# Patient Record
Sex: FEMALE | Race: WHITE | NOT HISPANIC OR LATINO | Employment: FULL TIME | URBAN - METROPOLITAN AREA
[De-identification: names, ages, dates, MRNs, and addresses within clinical notes are randomized per-mention and may not be internally consistent; named-entity substitution may affect disease eponyms.]

---

## 2018-01-15 NOTE — PROGRESS NOTES
Assessment    1  Bacterial vaginosis (616 10,041 9) (N76 0,A49 9)   2  Vaginal yeast infection (112 1) (B37 3)   3  Bartholin cyst (616 2) (N75 0)    Plan  Bacterial vaginosis    · MetroNIDAZOLE 500 MG Oral Tablet; TAKE 1 TABLET TWICE DAILY UNTIL  FINISHED   Rx By: Enedina Santoyo; Dispense: 7 Days ; #:14 Tablet; Refill: 0; For: Bacterial vaginosis; ED = N; Verified Transmission to [facility] Last Updated By: System, [employer]; 1/18/2016 10:41:54 AM  Bacterial vaginosis, Vaginal yeast infection    · Wet [place]- POC; Status:Complete;   Done: [id number] 12:00AM   Performed: In Office; VEX:[id number]; Today; For:Bacterial vaginosis, Vaginal yeast infection; Ordered By:Mala Boss;  Vaginal yeast infection    · Monistat 3 Combination Pack Vaginal Kit; SUPP: 1 VAGINALLY DAILY AT BEDTIME  FOR 3 NIGHTS  CRM: APPLY TWICE DAILY AS NEEDED   Rx By: Enedina Santoyo; Dispense: 3 Days ; #:1 GM; Refill: 0; For: Vaginal yeast infection; ED = N; Verified Transmission to [facility] Last Updated By: System, [employer]; 1/18/2016 10:41:54 AM    Discussion/Summary  Discussion Summary:     16yo female with word catheter in place for Bartholin cyst   1) Bartholin cyst treated  -- no pain, no erythema, epithelialization visualized around word catheter entrance site  -- word catheter removed without difficulty  2) foul discharge -- bacterial vaginosis and yeast infection  -- abundant clue cells, +whiff, and moderate yeast seen on wet mount and KOH  -- rx sent for metronidazole 500mg bid for 7days and monistat 3 for yeast infection  Pt to RTC as needed for annual exams  Patient and plan d/w Dr Yenny Garcia  Chief Complaint  Chief Complaint Free Text Note Form: Patient is here for follow up for Bartholin cyst       History of Present Illness  HPI:   16yo female with word catheter in place from Bartholin's cyst  Pt is here for follow up and hopefully removal of catheter   Pt reports no issues or pain with catheter, denies fever/chills/sweats  Denies urinary issues  Reports some discharge, fishy in odor that has increased since taking antibiotics when word catheter placed  Pt has not been sexually active since catheter placement  Hospital Based Practices Required Assessment:   Pain Assessment   the patient states they do not have pain  Abuse And Domestic Violence Screen    Yes, the patient is safe at home  The patient states no one is hurting them  Depression And Suicide Screen  No, the patient has not had thoughts of hurting themself  No, the patient has not felt depressed in the past 7 days  Prefered Language is  [place]  Primary Language is  English  Active Problems    1  Anxiety (300 00) (F41 9)   2  Bartholin cyst (616 2) (N75 0)    Family History    1  Family history of Hypothyroidism    Social History    · Caffeine Use   · Current Every Day Smoker (305 1)   · Marital History - Single   · Occupation:   · Occupation:    Allergies    1  Clindamycin    Vitals  Vital Signs [Data Includes: Current Encounter]    Recorded: 30WAG4848 15:34KG   Systolic 365   Diastolic 58   Height 5 ft 1 in   Weight 157 lb    BMI Calculated 29 67   BSA Calculated 1 7   LMP [id number]     Physical Exam    Genitourinary   External genitalia: Normal and no lesions appreciated  word catheter in place, no erythema, no warmth, no drainage  Vagina: Normal, no lesions or dryness appreciated  no lacerations or lesions  Foul fish like odor with discharge  Urethra: Normal     Cervix: Normal, no palpable masses  thin discharge from os  Uterus: Normal, non-tender, not enlarged, and no palpable masses  Adnexa/parametria: Normal, non-tender and no fullness or masses appreciated      Psychiatric   Orientation to person, place, and time: Normal     Mood and affect: Normal        Results/Data  Encounter Results   Screen Ebola Flowsheet [id number] 09:49AM      Test Name Result Flag Reference   Exposure to Ebola Virus - Within 21 Days No       Wet [facility] [id number] 12:00AM Florin Marquez     Test Name Result Flag Reference   BACTERIA none     CLUE CELL abundant     TRICHOMONAS none     YEAST WITH HYPHAE moderate     YEAST moderate     KOH PREP yeast visualized         Signatures   Electronically signed by : SUN Dunbar ; Jan 18 2016  2:49PM EST                       (Author)    Electronically signed by : Donald Almazan MD; Jan 25 2016  1:55PM EST

## 2018-09-15 ENCOUNTER — HOSPITAL ENCOUNTER (EMERGENCY)
Facility: HOSPITAL | Age: 23
Discharge: HOME | End: 2018-09-15
Attending: EMERGENCY MEDICINE | Admitting: EMERGENCY MEDICINE
Payer: SUBSIDIZED

## 2018-09-15 ENCOUNTER — APPOINTMENT (EMERGENCY)
Dept: RADIOLOGY | Facility: HOSPITAL | Age: 23
End: 2018-09-15
Payer: SUBSIDIZED

## 2018-09-15 VITALS
RESPIRATION RATE: 18 BRPM | OXYGEN SATURATION: 100 % | TEMPERATURE: 98.5 F | BODY MASS INDEX: 28.34 KG/M2 | DIASTOLIC BLOOD PRESSURE: 50 MMHG | WEIGHT: 150 LBS | HEART RATE: 76 BPM | SYSTOLIC BLOOD PRESSURE: 95 MMHG

## 2018-09-15 DIAGNOSIS — N39.0 UTI (URINARY TRACT INFECTION): Primary | ICD-10-CM

## 2018-09-15 LAB
BACTERIA UR QL AUTO: ABNORMAL /HPF
BILIRUB UR QL STRIP: ABNORMAL
CLARITY UR: ABNORMAL
COLOR UR: YELLOW
EXT PREG TEST URINE: NEGATIVE
GLUCOSE UR STRIP-MCNC: NEGATIVE MG/DL
HGB UR QL STRIP.AUTO: ABNORMAL
KETONES UR STRIP-MCNC: NEGATIVE MG/DL
LEUKOCYTE ESTERASE UR QL STRIP: ABNORMAL
NITRITE UR QL STRIP: NEGATIVE
NON-SQ EPI CELLS URNS QL MICRO: ABNORMAL /HPF
PH UR STRIP.AUTO: 5.5 [PH] (ref 5–9)
PROT UR STRIP-MCNC: NEGATIVE MG/DL
RBC #/AREA URNS AUTO: ABNORMAL /HPF
SP GR UR STRIP.AUTO: >=1.03 (ref 1–1.03)
UROBILINOGEN UR QL STRIP.AUTO: 1 E.U./DL
WBC #/AREA URNS AUTO: ABNORMAL /HPF

## 2018-09-15 PROCEDURE — 81025 URINE PREGNANCY TEST: CPT | Performed by: EMERGENCY MEDICINE

## 2018-09-15 PROCEDURE — 99284 EMERGENCY DEPT VISIT MOD MDM: CPT

## 2018-09-15 PROCEDURE — 74176 CT ABD & PELVIS W/O CONTRAST: CPT

## 2018-09-15 PROCEDURE — 81001 URINALYSIS AUTO W/SCOPE: CPT | Performed by: EMERGENCY MEDICINE

## 2018-09-15 RX ORDER — PHENAZOPYRIDINE HYDROCHLORIDE 200 MG/1
200 TABLET, FILM COATED ORAL 3 TIMES DAILY
Qty: 6 TABLET | Refills: 0 | Status: SHIPPED | OUTPATIENT
Start: 2018-09-15 | End: 2018-09-15

## 2018-09-15 RX ORDER — SULFAMETHOXAZOLE AND TRIMETHOPRIM 800; 160 MG/1; MG/1
1 TABLET ORAL 2 TIMES DAILY
Qty: 14 TABLET | Refills: 0 | Status: SHIPPED | OUTPATIENT
Start: 2018-09-15 | End: 2018-09-22

## 2018-09-15 RX ORDER — SULFAMETHOXAZOLE AND TRIMETHOPRIM 800; 160 MG/1; MG/1
1 TABLET ORAL ONCE
Status: COMPLETED | OUTPATIENT
Start: 2018-09-15 | End: 2018-09-15

## 2018-09-15 RX ORDER — PHENAZOPYRIDINE HYDROCHLORIDE 100 MG/1
100 TABLET, FILM COATED ORAL ONCE
Status: COMPLETED | OUTPATIENT
Start: 2018-09-15 | End: 2018-09-15

## 2018-09-15 RX ORDER — IBUPROFEN 600 MG/1
600 TABLET ORAL ONCE
Status: COMPLETED | OUTPATIENT
Start: 2018-09-15 | End: 2018-09-15

## 2018-09-15 RX ORDER — PHENAZOPYRIDINE HYDROCHLORIDE 200 MG/1
200 TABLET, FILM COATED ORAL 3 TIMES DAILY
Qty: 6 TABLET | Refills: 0 | Status: SHIPPED | OUTPATIENT
Start: 2018-09-15

## 2018-09-15 RX ORDER — SULFAMETHOXAZOLE AND TRIMETHOPRIM 800; 160 MG/1; MG/1
1 TABLET ORAL 2 TIMES DAILY
Qty: 14 TABLET | Refills: 0 | Status: SHIPPED | OUTPATIENT
Start: 2018-09-15 | End: 2018-09-15

## 2018-09-15 RX ADMIN — PHENAZOPYRIDINE HYDROCHLORIDE 100 MG: 100 TABLET ORAL at 23:08

## 2018-09-15 RX ADMIN — SULFAMETHOXAZOLE AND TRIMETHOPRIM 1 TABLET: 800; 160 TABLET ORAL at 23:08

## 2018-09-15 RX ADMIN — IBUPROFEN 600 MG: 600 TABLET, FILM COATED ORAL at 23:08

## 2018-09-16 NOTE — DISCHARGE INSTRUCTIONS

## 2018-09-16 NOTE — ED PROVIDER NOTES
History  Chief Complaint   Patient presents with    Possible UTI     States she had " tingling " when voided this morning  States pain left flank and left lower abdomen and states when she was pregnant she was told she has stones in her kidney, flank pain started at 2 pm    Flank Pain     23 yowf c/o burning sensation with urination since this morning and then about 2 pm today started with severe left flank pain  + associated nausea, no vomiting or diarrhea  No fever  Is on 5 year IUD, denies pregnancy, vaginal bleeding or discharge  History provided by:  Patient   used: No    Flank Pain   Associated symptoms: dysuria    Associated symptoms: no chest pain, no cough, no diarrhea, no fever, no nausea, no shortness of breath and no vomiting        None       Past Medical History:   Diagnosis Date    Kidney stones        Past Surgical History:   Procedure Laterality Date     SECTION      x 1    TONSILLECTOMY         History reviewed  No pertinent family history  I have reviewed and agree with the history as documented  Social History   Substance Use Topics    Smoking status: Current Some Day Smoker     Packs/day: 0 20     Types: Cigarettes    Smokeless tobacco: Never Used    Alcohol use No        Review of Systems   Constitutional: Negative  Negative for fever  HENT: Negative  Eyes: Negative  Respiratory: Negative  Negative for cough and shortness of breath  Cardiovascular: Negative  Negative for chest pain  Gastrointestinal: Negative  Negative for abdominal pain, diarrhea, nausea and vomiting  Genitourinary: Positive for dysuria and flank pain  Musculoskeletal: Negative for back pain and myalgias  Skin: Negative  Negative for rash  Neurological: Negative  Negative for dizziness and headaches  Hematological: Does not bruise/bleed easily  Psychiatric/Behavioral: Negative  All other systems reviewed and are negative        Physical Exam  Physical Exam   Constitutional: She is oriented to person, place, and time  She appears well-developed and well-nourished  No distress  HENT:   Head: Normocephalic and atraumatic  Eyes: Conjunctivae are normal  Pupils are equal, round, and reactive to light  Neck: Normal range of motion  Neck supple  Cardiovascular: Normal rate, regular rhythm and normal heart sounds  No murmur heard  Pulmonary/Chest: Effort normal and breath sounds normal  No respiratory distress  Abdominal: Soft  Bowel sounds are normal  She exhibits no distension  There is no tenderness  Musculoskeletal: Normal range of motion  She exhibits no edema or deformity  Back + mild left flank ttp   Neurological: She is alert and oriented to person, place, and time  No cranial nerve deficit  She exhibits normal muscle tone  Skin: Skin is warm and dry  No rash noted  She is not diaphoretic  No pallor  Psychiatric: She has a normal mood and affect  Her behavior is normal    Nursing note and vitals reviewed        Vital Signs  ED Triage Vitals [09/15/18 2057]   Temperature Pulse Respirations Blood Pressure SpO2   98 5 °F (36 9 °C) 76 18 95/50 100 %      Temp Source Heart Rate Source Patient Position - Orthostatic VS BP Location FiO2 (%)   Oral Monitor Sitting Left arm --      Pain Score       7           Vitals:    09/15/18 2057 09/15/18 2100   BP: 95/50 95/50   Pulse: 76 76   Patient Position - Orthostatic VS: Sitting        Visual Acuity      ED Medications  Medications   sulfamethoxazole-trimethoprim (BACTRIM DS) 800-160 mg per tablet 1 tablet (not administered)   phenazopyridine (PYRIDIUM) tablet 100 mg (not administered)   ibuprofen (MOTRIN) tablet 600 mg (not administered)       Diagnostic Studies  Results Reviewed     Procedure Component Value Units Date/Time    Urine Microscopic [[id number]]  (Abnormal) Collected:  09/15/18 2131    Lab Status:  Final result Specimen:  Urine from Urine, Clean Catch Updated:  09/15/18 2226 RBC, UA None Seen /hpf      WBC, UA Innumerable (A) /hpf      Epithelial Cells Moderate (A) /hpf      Bacteria, UA Occasional /hpf     UA w Reflex to Microscopic [[id number]]  (Abnormal) Collected:  09/15/18 2131    Lab Status:  Final result Specimen:  Urine from Urine, Clean Catch Updated:  09/15/18 2147     Color, UA Yellow     Clarity, UA Cloudy     Specific Gravity, UA >=1 030     pH, UA 5 5     Leukocytes, UA Moderate (A)     Nitrite, UA Negative     Protein, UA Negative mg/dl      Glucose, UA Negative mg/dl      Ketones, UA Negative mg/dl      Urobilinogen, UA 1 0 E U /dl      Bilirubin, UA Interference- unable to analyze (A)     Blood, UA Small (A)    POCT pregnancy, urine [[id number]]  (Normal) Resulted:  09/15/18 2129    Lab Status:  Final result Updated:  09/15/18 2129     EXT PREG TEST UR (Ref: Negative) negative                 CT renal stone study abdomen pelvis without contrast   Final Result by Burton Higgins MD (09/15 2249)      1  No significant renal, ureteral, or bladder calculus  2   Intrauterine device in place  Workstation performed: [id number]                    Procedures  Procedures       Phone Contacts  ED Phone Contact    ED Course                               MDM  Number of Diagnoses or Management Options  Diagnosis management comments: No stone, + UTI, will tx with bactrim and pyridium  Advised rest, fluids, tylenol and/or advil prn  Follow up if any problems  CritCare Time    Disposition  Final diagnoses:   UTI (urinary tract infection)     Time reflects when diagnosis was documented in both MDM as applicable and the Disposition within this note     Time User Action Codes Description Comment    0/22/9963 84:76 PM Skyla MAE Add [L66 0] UTI (urinary tract infection)       ED Disposition     ED Disposition Condition Comment    Discharge  [facility] discharge to home/self care      Condition at discharge: Stable        Follow-up Information     Follow up With Specialties Details Why [address] [facility]  As needed [address]  [address] [facility]  [address] [address]  [phone]            Patient's Medications   Discharge Prescriptions    PHENAZOPYRIDINE (PYRIDIUM) 200 MG TABLET    Take 1 tablet (200 mg total) by mouth 3 (three) times a day       Start Date: 9/15/2018 End Date: --       Order Dose: 200 mg       Quantity: 6 tablet    Refills: 0    SULFAMETHOXAZOLE-TRIMETHOPRIM (BACTRIM DS) 800-160 MG PER TABLET    Take 1 tablet by mouth 2 (two) times a day for 7 days       Start Date: 9/15/2018 End Date: 9/22/2018       Order Dose: 1 tablet       Quantity: 14 tablet    Refills: 0     No discharge procedures on file      ED Provider  Electronically Signed by           Marvene Litten, MD  47/95/59 7455       Marvene Litten, MD  71/54/08 5617

## 2019-12-08 ENCOUNTER — HOSPITAL ENCOUNTER (EMERGENCY)
Facility: HOSPITAL | Age: 24
Discharge: HOME | End: 2019-12-09
Attending: EMERGENCY MEDICINE | Admitting: EMERGENCY MEDICINE
Payer: COMMERCIAL

## 2019-12-08 DIAGNOSIS — J06.9 VIRAL URI WITH COUGH: Primary | ICD-10-CM

## 2019-12-08 DIAGNOSIS — J06.9 UPPER RESPIRATORY INFECTION: ICD-10-CM

## 2019-12-08 DIAGNOSIS — J11.1 INFLUENZA: ICD-10-CM

## 2019-12-08 PROCEDURE — 87631 RESP VIRUS 3-5 TARGETS: CPT | Performed by: EMERGENCY MEDICINE

## 2019-12-08 PROCEDURE — 99283 EMERGENCY DEPT VISIT LOW MDM: CPT

## 2019-12-09 VITALS
WEIGHT: 135.4 LBS | OXYGEN SATURATION: 97 % | TEMPERATURE: 100.4 F | SYSTOLIC BLOOD PRESSURE: 102 MMHG | BODY MASS INDEX: 25.58 KG/M2 | RESPIRATION RATE: 18 BRPM | HEART RATE: 105 BPM | DIASTOLIC BLOOD PRESSURE: 66 MMHG

## 2019-12-09 LAB
FLUAV RNA NPH QL NAA+PROBE: ABNORMAL
FLUBV RNA NPH QL NAA+PROBE: DETECTED
RSV RNA NPH QL NAA+PROBE: ABNORMAL

## 2019-12-09 RX ORDER — OSELTAMIVIR PHOSPHATE 75 MG/1
75 CAPSULE ORAL ONCE
Status: COMPLETED | OUTPATIENT
Start: 2019-12-09 | End: 2019-12-09

## 2019-12-09 RX ORDER — IBUPROFEN 400 MG/1
400 TABLET ORAL ONCE
Status: COMPLETED | OUTPATIENT
Start: 2019-12-09 | End: 2019-12-09

## 2019-12-09 RX ORDER — OSELTAMIVIR PHOSPHATE 75 MG/1
75 CAPSULE ORAL 2 TIMES DAILY
Qty: 10 CAPSULE | Refills: 0 | Status: SHIPPED | OUTPATIENT
Start: 2019-12-09 | End: 2019-12-14

## 2019-12-09 RX ADMIN — OSELTAMIVIR PHOSPHATE 75 MG: 75 CAPSULE ORAL at 00:49

## 2019-12-09 RX ADMIN — IBUPROFEN 400 MG: 400 TABLET ORAL at 00:50

## 2020-02-03 ENCOUNTER — APPOINTMENT (EMERGENCY)
Dept: RADIOLOGY | Facility: HOSPITAL | Age: 25
End: 2020-02-03
Payer: COMMERCIAL

## 2020-02-03 ENCOUNTER — HOSPITAL ENCOUNTER (EMERGENCY)
Facility: HOSPITAL | Age: 25
Discharge: HOME | End: 2020-02-03
Attending: EMERGENCY MEDICINE | Admitting: EMERGENCY MEDICINE
Payer: COMMERCIAL

## 2020-02-03 VITALS
SYSTOLIC BLOOD PRESSURE: 118 MMHG | OXYGEN SATURATION: 99 % | WEIGHT: 132.4 LBS | BODY MASS INDEX: 25 KG/M2 | TEMPERATURE: 99.3 F | HEIGHT: 61 IN | DIASTOLIC BLOOD PRESSURE: 59 MMHG | RESPIRATION RATE: 18 BRPM | HEART RATE: 93 BPM

## 2020-02-03 DIAGNOSIS — N39.0 UTI (URINARY TRACT INFECTION): ICD-10-CM

## 2020-02-03 DIAGNOSIS — R68.84 JAW PAIN: Primary | ICD-10-CM

## 2020-02-03 LAB
ANION GAP SERPL CALCULATED.3IONS-SCNC: 4 MMOL/L (ref 4–13)
BACTERIA UR QL AUTO: ABNORMAL /HPF
BASOPHILS # BLD AUTO: 0.03 THOUSANDS/ΜL (ref 0–0.1)
BASOPHILS NFR BLD AUTO: 0 % (ref 0–1)
BILIRUB UR QL STRIP: NEGATIVE
BUN SERPL-MCNC: 5 MG/DL (ref 5–25)
CALCIUM SERPL-MCNC: 8.5 MG/DL (ref 8.3–10.1)
CHLORIDE SERPL-SCNC: 103 MMOL/L (ref 100–108)
CLARITY UR: ABNORMAL
CO2 SERPL-SCNC: 28 MMOL/L (ref 21–32)
COLOR UR: YELLOW
CREAT SERPL-MCNC: 0.61 MG/DL (ref 0.6–1.3)
EOSINOPHIL # BLD AUTO: 0.11 THOUSAND/ΜL (ref 0–0.61)
EOSINOPHIL NFR BLD AUTO: 1 % (ref 0–6)
ERYTHROCYTE [DISTWIDTH] IN BLOOD BY AUTOMATED COUNT: 12.6 % (ref 11.6–15.1)
EXT PREG TEST URINE: NEGATIVE
EXT. CONTROL ED NAV: NORMAL
FLUAV RNA NPH QL NAA+PROBE: NORMAL
FLUBV RNA NPH QL NAA+PROBE: NORMAL
GFR SERPL CREATININE-BSD FRML MDRD: 126 ML/MIN/1.73SQ M
GLUCOSE SERPL-MCNC: 99 MG/DL (ref 65–140)
GLUCOSE UR STRIP-MCNC: NEGATIVE MG/DL
HCT VFR BLD AUTO: 41.6 % (ref 34.8–46.1)
HGB BLD-MCNC: 14.2 G/DL (ref 11.5–15.4)
HGB UR QL STRIP.AUTO: ABNORMAL
IMM GRANULOCYTES # BLD AUTO: 0.03 THOUSAND/UL (ref 0–0.2)
IMM GRANULOCYTES NFR BLD AUTO: 0 % (ref 0–2)
KETONES UR STRIP-MCNC: NEGATIVE MG/DL
LEUKOCYTE ESTERASE UR QL STRIP: ABNORMAL
LYMPHOCYTES # BLD AUTO: 1.56 THOUSANDS/ΜL (ref 0.6–4.47)
LYMPHOCYTES NFR BLD AUTO: 17 % (ref 14–44)
MCH RBC QN AUTO: 32.2 PG (ref 26.8–34.3)
MCHC RBC AUTO-ENTMCNC: 34.1 G/DL (ref 31.4–37.4)
MCV RBC AUTO: 94 FL (ref 82–98)
MONOCYTES # BLD AUTO: 0.68 THOUSAND/ΜL (ref 0.17–1.22)
MONOCYTES NFR BLD AUTO: 8 % (ref 4–12)
MUCOUS THREADS UR QL AUTO: ABNORMAL
NEUTROPHILS # BLD AUTO: 6.66 THOUSANDS/ΜL (ref 1.85–7.62)
NEUTS SEG NFR BLD AUTO: 74 % (ref 43–75)
NITRITE UR QL STRIP: POSITIVE
NON-SQ EPI CELLS URNS QL MICRO: ABNORMAL /HPF
NRBC BLD AUTO-RTO: 0 /100 WBCS
PH UR STRIP.AUTO: 6 [PH]
PLATELET # BLD AUTO: 175 THOUSANDS/UL (ref 149–390)
PMV BLD AUTO: 9.3 FL (ref 8.9–12.7)
POTASSIUM SERPL-SCNC: 3.5 MMOL/L (ref 3.5–5.3)
PROT UR STRIP-MCNC: NEGATIVE MG/DL
RBC # BLD AUTO: 4.41 MILLION/UL (ref 3.81–5.12)
RBC #/AREA URNS AUTO: ABNORMAL /HPF
RSV RNA NPH QL NAA+PROBE: NORMAL
SODIUM SERPL-SCNC: 135 MMOL/L (ref 136–145)
SP GR UR STRIP.AUTO: 1.02 (ref 1–1.03)
UROBILINOGEN UR QL STRIP.AUTO: 0.2 E.U./DL
WBC # BLD AUTO: 9.07 THOUSAND/UL (ref 4.31–10.16)
WBC #/AREA URNS AUTO: ABNORMAL /HPF

## 2020-02-03 PROCEDURE — 85025 COMPLETE CBC W/AUTO DIFF WBC: CPT | Performed by: EMERGENCY MEDICINE

## 2020-02-03 PROCEDURE — 99284 EMERGENCY DEPT VISIT MOD MDM: CPT

## 2020-02-03 PROCEDURE — 81001 URINALYSIS AUTO W/SCOPE: CPT | Performed by: EMERGENCY MEDICINE

## 2020-02-03 PROCEDURE — 96374 THER/PROPH/DIAG INJ IV PUSH: CPT

## 2020-02-03 PROCEDURE — 96361 HYDRATE IV INFUSION ADD-ON: CPT

## 2020-02-03 PROCEDURE — 71045 X-RAY EXAM CHEST 1 VIEW: CPT

## 2020-02-03 PROCEDURE — 81025 URINE PREGNANCY TEST: CPT | Performed by: EMERGENCY MEDICINE

## 2020-02-03 PROCEDURE — 36415 COLL VENOUS BLD VENIPUNCTURE: CPT | Performed by: EMERGENCY MEDICINE

## 2020-02-03 PROCEDURE — 99284 EMERGENCY DEPT VISIT MOD MDM: CPT | Performed by: EMERGENCY MEDICINE

## 2020-02-03 PROCEDURE — 80048 BASIC METABOLIC PNL TOTAL CA: CPT | Performed by: EMERGENCY MEDICINE

## 2020-02-03 PROCEDURE — 87086 URINE CULTURE/COLONY COUNT: CPT | Performed by: EMERGENCY MEDICINE

## 2020-02-03 PROCEDURE — 87631 RESP VIRUS 3-5 TARGETS: CPT | Performed by: EMERGENCY MEDICINE

## 2020-02-03 RX ORDER — NAPROXEN 500 MG/1
500 TABLET ORAL 2 TIMES DAILY WITH MEALS
Qty: 10 TABLET | Refills: 0 | Status: SHIPPED | OUTPATIENT
Start: 2020-02-03 | End: 2021-07-20

## 2020-02-03 RX ORDER — KETOROLAC TROMETHAMINE 30 MG/ML
30 INJECTION, SOLUTION INTRAMUSCULAR; INTRAVENOUS ONCE
Status: COMPLETED | OUTPATIENT
Start: 2020-02-03 | End: 2020-02-03

## 2020-02-03 RX ORDER — ACETAMINOPHEN 325 MG/1
650 TABLET ORAL ONCE
Status: COMPLETED | OUTPATIENT
Start: 2020-02-03 | End: 2020-02-03

## 2020-02-03 RX ORDER — CEPHALEXIN 500 MG/1
500 CAPSULE ORAL ONCE
Status: COMPLETED | OUTPATIENT
Start: 2020-02-03 | End: 2020-02-03

## 2020-02-03 RX ORDER — CEPHALEXIN 500 MG/1
500 CAPSULE ORAL 2 TIMES DAILY
Qty: 10 CAPSULE | Refills: 0 | Status: SHIPPED | OUTPATIENT
Start: 2020-02-03 | End: 2020-02-08

## 2020-02-03 RX ADMIN — CEPHALEXIN 500 MG: 500 CAPSULE ORAL at 18:13

## 2020-02-03 RX ADMIN — KETOROLAC TROMETHAMINE 30 MG: 30 INJECTION, SOLUTION INTRAMUSCULAR at 17:00

## 2020-02-03 RX ADMIN — SODIUM CHLORIDE 1000 ML: 0.9 INJECTION, SOLUTION INTRAVENOUS at 16:57

## 2020-02-03 RX ADMIN — ACETAMINOPHEN 650 MG: 325 TABLET, FILM COATED ORAL at 17:00

## 2020-02-03 NOTE — ED PROVIDER NOTES
History  Chief Complaint   Patient presents with    Jaw Pain     Pt reported of BL jaw pain with fever and coughing at home  C/O RT JAW/NECK PAIN, BODY ACHE, LEGS PAIN X 2 DAYS  NRML PHARYNX/DENTATION      History provided by:  Patient  URI   Presenting symptoms: ear pain and fatigue    Presenting symptoms comment:  RT JAW PAIN, RT NECK PAIN  Severity:  Unable to specify  Onset quality:  Unable to specify  Duration:  2 days  Timing:  Constant  Chronicity:  New  Relieved by:  None tried  Worsened by:  Nothing  Ineffective treatments:  None tried  Associated symptoms: myalgias and neck pain        Prior to Admission Medications   Prescriptions Last Dose Informant Patient Reported? Taking? phenazopyridine (PYRIDIUM) 200 mg tablet Not Taking at Unknown time  No No   Sig: Take 1 tablet (200 mg total) by mouth 3 (three) times a day   Patient not taking: Reported on 2/3/2020      Facility-Administered Medications: None       Past Medical History:   Diagnosis Date    Kidney stones        Past Surgical History:   Procedure Laterality Date     SECTION      x 1    TONSILLECTOMY         History reviewed  No pertinent family history  I have reviewed and agree with the history as documented  Social History     Tobacco Use    Smoking status: Current Some Day Smoker     Packs/day: 0 20     Types: Cigarettes    Smokeless tobacco: Never Used   Substance Use Topics    Alcohol use: No    Drug use: No        Review of Systems   Constitutional: Positive for fatigue  HENT: Positive for ear pain  Musculoskeletal: Positive for myalgias and neck pain  Skin: Negative  All other systems reviewed and are negative  Physical Exam  Physical Exam   Constitutional: She is oriented to person, place, and time  She appears well-developed and well-nourished  HENT:   Head: Normocephalic and atraumatic     Right Ear: External ear normal    Left Ear: External ear normal    Mouth/Throat: Oropharynx is clear and moist    Eyes: Pupils are equal, round, and reactive to light  Conjunctivae and EOM are normal    Neck: Normal range of motion  Neck supple  Cardiovascular: Normal rate and regular rhythm  Pulmonary/Chest: Effort normal and breath sounds normal    Abdominal: Soft  There is no tenderness  Musculoskeletal: Normal range of motion  Lymphadenopathy:     She has no cervical adenopathy  Neurological: She is oriented to person, place, and time  Skin: Skin is warm and dry  Nursing note and vitals reviewed  Vital Signs  ED Triage Vitals   Temperature Pulse Respirations Blood Pressure SpO2   02/03/20 1615 02/03/20 1615 02/03/20 1615 02/03/20 1615 02/03/20 1615   99 3 °F (37 4 °C) 93 18 118/59 99 %      Temp Source Heart Rate Source Patient Position - Orthostatic VS BP Location FiO2 (%)   02/03/20 1615 02/03/20 1615 02/03/20 1615 02/03/20 1615 --   Tympanic Monitor Sitting Right arm       Pain Score       02/03/20 1700       8           Vitals:    02/03/20 1615   BP: 118/59   Pulse: 93   Patient Position - Orthostatic VS: Sitting         Visual Acuity      ED Medications  Medications   sodium chloride 0 9 % bolus 1,000 mL (1,000 mL Intravenous New Bag 2/3/20 1657)   ketorolac (TORADOL) injection 30 mg (30 mg Intravenous Given 2/3/20 1700)   acetaminophen (TYLENOL) tablet 650 mg (650 mg Oral Given 2/3/20 1700)   cephalexin (KEFLEX) capsule 500 mg (500 mg Oral Given 2/3/20 1813)       Diagnostic Studies  Results Reviewed     Procedure Component Value Units Date/Time    Urine culture [[id number]] Collected:  02/03/20 1719    Lab Status:   In process Specimen:  Urine, Clean Catch Updated:  02/03/20 1806    Urine Microscopic [[id number]]  (Abnormal) Collected:  02/03/20 1719    Lab Status:  Final result Specimen:  Urine, Clean Catch Updated:  02/03/20 1738     RBC, UA 10-20 /hpf      WBC, UA 4-10 /hpf      Epithelial Cells Occasional /hpf      Bacteria, UA Moderate /hpf      MUCUS THREADS Innumerable    Influenza A/B and RSV PCR [[id number]]  (Normal) Collected:  02/03/20 1657    Lab Status:  Final result Specimen:  Nasopharyngeal from Nose Updated:  02/03/20 1737     INFLUENZA A PCR None Detected     INFLUENZA B PCR None Detected     RSV PCR None Detected    POCT pregnancy, urine [[id number]]  (Normal) Resulted:  02/03/20 1727    Lab Status:  Final result Updated:  02/03/20 1727     EXT PREG TEST UR (Ref: Negative) negative     Control valid    UA (URINE) with reflex to Scope [[id number]]  (Abnormal) Collected:  02/03/20 1719    Lab Status:  Final result Specimen:  Urine, Clean Catch Updated:  02/03/20 1726     Color, UA Yellow     Clarity, UA Slightly Cloudy     Specific [place], UA 1 020     pH, UA 6 0     Leukocytes, UA Small     Nitrite, UA Positive     Protein, UA Negative mg/dl      Glucose, UA Negative mg/dl      Ketones, UA Negative mg/dl      Urobilinogen, UA 0 2 E U /dl      Bilirubin, UA Negative     Blood, UA Small    Basic metabolic panel [[id number]]  (Abnormal) Collected:  02/03/20 1657    Lab Status:  Final result Specimen:  Blood from Arm, Left Updated:  02/03/20 1713     Sodium 135 mmol/L      Potassium 3 5 mmol/L      Chloride 103 mmol/L      CO2 28 mmol/L      ANION GAP 4 mmol/L      BUN 5 mg/dL      Creatinine 0 61 mg/dL      Glucose 99 mg/dL      Calcium 8 5 mg/dL      eGFR 126 ml/min/1 73sq m     Narrative:       Le guidelines for Chronic Kidney Disease (CKD):     Stage 1 with normal or high GFR (GFR > 90 mL/min/1 73 square meters)    Stage 2 Mild CKD (GFR = 60-89 mL/min/1 73 square meters)    Stage 3A Moderate CKD (GFR = 45-59 mL/min/1 73 square meters)    Stage 3B Moderate CKD (GFR = 30-44 mL/min/1 73 square meters)    Stage 4 Severe CKD (GFR = 15-29 mL/min/1 73 square meters)    Stage 5 End Stage CKD (GFR <15 mL/min/1 73 square meters)  Note: GFR calculation is accurate only with a steady state creatinine    CBC and differential [[id number]] Collected:  02/03/20 1657    Lab Status:  Final result Specimen:  Blood from Arm, Left Updated:  02/03/20 1703     WBC 9 07 Thousand/uL      RBC 4 41 Million/uL      Hemoglobin 14 2 g/dL      Hematocrit 41 6 %      MCV 94 fL      MCH 32 2 pg      MCHC 34 1 g/dL      RDW 12 6 %      MPV 9 3 fL      Platelets 696 Thousands/uL      nRBC 0 /100 WBCs      Neutrophils Relative 74 %      Immat GRANS % 0 %      Lymphocytes Relative 17 %      Monocytes Relative 8 %      Eosinophils Relative 1 %      Basophils Relative 0 %      Neutrophils Absolute 6 66 Thousands/µL      Immature Grans Absolute 0 03 Thousand/uL      Lymphocytes Absolute 1 56 Thousands/µL      Monocytes Absolute 0 68 Thousand/µL      Eosinophils Absolute 0 11 Thousand/µL      Basophils Absolute 0 03 Thousands/µL                  XR chest portable   ED Interpretation by Lashonda Coyne MD (02/03 5569)   NEG                 Procedures  Procedures         ED Course                               MDM      Disposition  Final diagnoses:   Jaw pain   UTI (urinary tract infection)     Time reflects when diagnosis was documented in both MDM as applicable and the Disposition within this note     Time User Action Codes Description Comment    2/3/2020  6:16 PM BarsRanjit tuckerat Add [R68 84] Jaw pain     2/3/2020  6:16 PM Ranjit Mariat Add [N39 0] UTI (urinary tract infection)       ED Disposition     ED Disposition Condition Date/Time Comment    Discharge Stable Mon Feb 3, 2020  6:16 PM [facility] discharge to home/self care              Follow-up Information     Follow up With Specialties Details Why Coleen Abreu MD Family Medicine Schedule an appointment as soon as possible for a visit in 3 days  [address]  [address] [address]  [phone]            Patient's Medications   Discharge Prescriptions    CEPHALEXIN (KEFLEX) 500 MG CAPSULE    Take 1 capsule (500 mg total) by mouth 2 (two) times a day for 10 doses       Start Date: 2/3/2020  End Date: 2/8/2020 Order Dose: 500 mg       Quantity: 10 capsule    Refills: 0    NAPROXEN (NAPROSYN) 500 MG TABLET    Take 1 tablet (500 mg total) by mouth 2 (two) times a day with meals for 5 days       Start Date: 2/3/2020  End Date: 2/8/2020       Order Dose: 500 mg       Quantity: 10 tablet    Refills: 0     No discharge procedures on file      ED Provider  Electronically Signed by           Jaime Monroe MD  02/03/20 4949

## 2020-02-04 LAB — BACTERIA UR CULT: NORMAL

## 2020-02-08 ENCOUNTER — HOSPITAL ENCOUNTER (EMERGENCY)
Facility: HOSPITAL | Age: 25
Discharge: HOME | End: 2020-02-08
Attending: EMERGENCY MEDICINE | Admitting: EMERGENCY MEDICINE
Payer: COMMERCIAL

## 2020-02-08 VITALS
TEMPERATURE: 97.2 F | RESPIRATION RATE: 18 BRPM | BODY MASS INDEX: 25.02 KG/M2 | HEIGHT: 61 IN | HEART RATE: 70 BPM | DIASTOLIC BLOOD PRESSURE: 63 MMHG | SYSTOLIC BLOOD PRESSURE: 109 MMHG | OXYGEN SATURATION: 100 % | WEIGHT: 132.5 LBS

## 2020-02-08 DIAGNOSIS — T78.40XA ALLERGIC REACTION, INITIAL ENCOUNTER: Primary | ICD-10-CM

## 2020-02-08 PROCEDURE — 99284 EMERGENCY DEPT VISIT MOD MDM: CPT | Performed by: EMERGENCY MEDICINE

## 2020-02-08 PROCEDURE — 99283 EMERGENCY DEPT VISIT LOW MDM: CPT

## 2020-02-08 RX ORDER — PREDNISONE 20 MG/1
40 TABLET ORAL ONCE
Status: COMPLETED | OUTPATIENT
Start: 2020-02-08 | End: 2020-02-08

## 2020-02-08 RX ORDER — PREDNISONE 10 MG/1
20 TABLET ORAL DAILY
Qty: 10 TABLET | Refills: 0 | Status: SHIPPED | OUTPATIENT
Start: 2020-02-08 | End: 2020-02-13

## 2020-02-08 RX ADMIN — PREDNISONE 40 MG: 20 TABLET ORAL at 14:07

## 2020-02-08 NOTE — ED PROVIDER NOTES
History  Chief Complaint   Patient presents with    Allergic Reaction     Patient states " i am having allergic reaction to something"       24-year-old female states she is having allergic reaction to something she does know what  Patient has had reactions in the past   At has similar type symptoms  She has developed a rash over her body as well as this morning on her face  Itching no fevers no other illnesses  Patient states she took some Benadryl which did help some but has not relieved totally  History provided by:  Patient   used: No        Prior to Admission Medications   Prescriptions Last Dose Informant Patient Reported? Taking? cephalexin (KEFLEX) 500 mg capsule   No No   Sig: Take 1 capsule (500 mg total) by mouth 2 (two) times a day for 10 doses   naproxen (NAPROSYN) 500 mg tablet   No No   Sig: Take 1 tablet (500 mg total) by mouth 2 (two) times a day with meals for 5 days   phenazopyridine (PYRIDIUM) 200 mg tablet   No No   Sig: Take 1 tablet (200 mg total) by mouth 3 (three) times a day   Patient not taking: Reported on 2/3/2020      Facility-Administered Medications: None       Past Medical History:   Diagnosis Date    Kidney stones        Past Surgical History:   Procedure Laterality Date     SECTION      x 1    TONSILLECTOMY         History reviewed  No pertinent family history  I have reviewed and agree with the history as documented  Social History     Tobacco Use    Smoking status: Current Some Day Smoker     Packs/day: 0 20     Types: Cigarettes    Smokeless tobacco: Never Used   Substance Use Topics    Alcohol use: No    Drug use: No        Review of Systems   Constitutional: Negative for activity change, chills, diaphoresis and fever  HENT: Negative for congestion, ear pain, nosebleeds, sore throat, trouble swallowing and voice change  Eyes: Negative for pain, discharge and redness     Respiratory: Negative for apnea, cough, choking, shortness of breath, wheezing and stridor  Cardiovascular: Negative for chest pain and palpitations  Gastrointestinal: Negative for abdominal distention, abdominal pain, constipation, diarrhea, nausea and vomiting  Endocrine: Negative for polydipsia  Genitourinary: Negative for difficulty urinating, dysuria, flank pain, frequency, hematuria and urgency  Musculoskeletal: Negative for back pain, gait problem, joint swelling, myalgias, neck pain and neck stiffness  Skin: Positive for rash  Negative for pallor  Neurological: Negative for dizziness, tremors, syncope, speech difficulty, weakness, numbness and headaches  Hematological: Negative for adenopathy  Psychiatric/Behavioral: Negative for confusion, hallucinations, self-injury and suicidal ideas  The patient is not nervous/anxious  Physical Exam  Physical Exam   Constitutional: She is oriented to person, place, and time  Vital signs are normal  She appears well-developed and well-nourished  No distress  HENT:   Head: Normocephalic and atraumatic  Right Ear: External ear normal    Left Ear: External ear normal    Nose: Nose normal    Mouth/Throat: Oropharynx is clear and moist    Eyes: Pupils are equal, round, and reactive to light  Conjunctivae are normal    Neck: Normal range of motion  Neck supple  Cardiovascular: Normal rate, regular rhythm, normal heart sounds and intact distal pulses  Pulmonary/Chest: Effort normal and breath sounds normal    Abdominal: Soft  Bowel sounds are normal    Musculoskeletal: Normal range of motion  Neurological: She is alert and oriented to person, place, and time  She has normal reflexes  Skin: Skin is warm and dry  Rash noted  She is not diaphoretic  Psychiatric: She has a normal mood and affect  Nursing note and vitals reviewed        Vital Signs  ED Triage Vitals [02/08/20 1347]   Temperature Pulse Respirations Blood Pressure SpO2   (!) 97 2 °F (36 2 °C) 70 18 109/63 100 %      Temp src Heart Rate Source Patient Position - Orthostatic VS BP Location FiO2 (%)   -- -- -- -- --      Pain Score       No Pain           Vitals:    02/08/20 1347   BP: 109/63   Pulse: 70         Visual Acuity      ED Medications  Medications   predniSONE tablet 40 mg (40 mg Oral Given 2/8/20 1407)       Diagnostic Studies  Results Reviewed     None                 No orders to display              Procedures  Procedures         ED Course                               MDM      Disposition  Final diagnoses: Allergic reaction, initial encounter     Time reflects when diagnosis was documented in both MDM as applicable and the Disposition within this note     Time User Action Codes Description Comment    2/8/2020  2:30 PM Tabitha Sanders [facility] Allergic reaction, initial encounter       ED Disposition     ED Disposition Condition Date/Time Comment    Discharge Stable Sat Feb 8, 2020  2:30 PM [facility] discharge to home/self care  Follow-up Information     Follow up With Specialties Details Why Jermain Godfrey MD Family Medicine Schedule an appointment as soon as possible for a visit  As needed [address]  [facility] [address]  [phone]            Patient's Medications   Discharge Prescriptions    PREDNISONE 10 MG TABLET    Take 2 tablets (20 mg total) by mouth daily for 5 days       Start Date: 2/8/2020  End Date: 2/13/2020       Order Dose: 20 mg       Quantity: 10 tablet    Refills: 0     No discharge procedures on file      ED Provider  Electronically Signed by           Svetlana Goss DO  02/08/20 2516

## 2020-07-02 NOTE — ED PROVIDER NOTES
History  Chief Complaint   Patient presents with    Cough     Pt states cough and fever x 3 days, pt's children are all sick as well        29 y/o female with sick kids with similar sx, presents with c/o URI sx  No rash  Got the flu shot, had viral illness last week  Now with fever and cough  History provided by:  Patient  Fever - 9 weeks to 74 years   Temp source:  Tympanic  Severity:  Moderate  Onset quality:  Gradual  Duration:  2 days  Timing:  Intermittent  Progression:  Resolved  Chronicity:  New  Relieved by:  Acetaminophen and ibuprofen  Worsened by:  Nothing  Associated symptoms: chills, congestion, cough and headaches    Associated symptoms: no diarrhea, no dysuria, no ear pain, no myalgias, no nausea, no rash and no vomiting    Congestion:     Location:  Chest and nasal    Interferes with eating/drinking: no    Cough:     Cough characteristics:  Non-productive    Sputum characteristics:  Nondescript    Severity:  Moderate    Onset quality:  Gradual    Duration:  2 days    Timing:  Intermittent    Progression:  Unchanged    Chronicity:  New  Headaches:     Severity:  Mild    Onset quality:  Unable to specify    Timing:  Intermittent    Progression:  Waxing and waning    Chronicity:  New  Risk factors: recent sickness and sick contacts    Risk factors: no recent travel        Prior to Admission Medications   Prescriptions Last Dose Informant Patient Reported? Taking? phenazopyridine (PYRIDIUM) 200 mg tablet   No No   Sig: Take 1 tablet (200 mg total) by mouth 3 (three) times a day      Facility-Administered Medications: None       Past Medical History:   Diagnosis Date    Kidney stones        Past Surgical History:   Procedure Laterality Date     SECTION      x 1    TONSILLECTOMY         History reviewed  No pertinent family history  I have reviewed and agree with the history as documented      Social History     Tobacco Use    Smoking status: Current Some Day Smoker     Packs/day: 0 20 Types: Cigarettes    Smokeless tobacco: Never Used   Substance Use Topics    Alcohol use: No    Drug use: No        Review of Systems   Constitutional: Positive for chills and fever  HENT: Positive for congestion  Negative for ear pain  Eyes: Negative  Respiratory: Positive for cough  Negative for chest tightness, shortness of breath and wheezing  Cardiovascular: Negative  Gastrointestinal: Negative  Negative for diarrhea, nausea and vomiting  Genitourinary: Negative  Negative for dysuria  Musculoskeletal: Negative  Negative for myalgias  Skin: Negative for rash  Neurological: Positive for headaches  Hematological: Negative  Psychiatric/Behavioral: Negative  All other systems reviewed and are negative  Physical Exam  Physical Exam   Constitutional: She is oriented to person, place, and time  She appears well-developed and well-nourished  HENT:   Head: Normocephalic and atraumatic  Right Ear: External ear normal    Left Ear: External ear normal    Nose: Nose normal    Mouth/Throat: Oropharynx is clear and moist    Eyes: Pupils are equal, round, and reactive to light  Conjunctivae and EOM are normal    Neck: Normal range of motion  Neck supple  Cardiovascular: Normal rate, regular rhythm, normal heart sounds and intact distal pulses  Pulmonary/Chest: Effort normal and breath sounds normal    Abdominal: Soft  Bowel sounds are normal    Musculoskeletal: Normal range of motion  Neurological: She is alert and oriented to person, place, and time  Skin: Skin is warm and dry  Capillary refill takes less than 2 seconds  Psychiatric: She has a normal mood and affect  Her behavior is normal  Judgment and thought content normal    Nursing note and vitals reviewed        Vital Signs  ED Triage Vitals [12/08/19 2319]   Temperature Pulse Respirations Blood Pressure SpO2   99 °F (37 2 °C) 105 18 102/66 97 %      Temp Source Heart Rate Source Patient Position - Orthostatic VS BP Location FiO2 (%)   Tympanic Monitor Sitting Right arm --      Pain Score       No Pain           Vitals:    12/08/19 2319   BP: 102/66   Pulse: 105   Patient Position - Orthostatic VS: Sitting         Visual Acuity      ED Medications  Medications   oseltamivir (TAMIFLU) capsule 75 mg (has no administration in time range)       Diagnostic Studies  Results Reviewed     Procedure Component Value Units Date/Time    Influenza A/B and RSV PCR [[id number]]  (Abnormal) Collected:  12/08/19 2328    Lab Status:  Final result Specimen:  Nose Updated:  12/09/19 0025     INFLUENZA A PCR None Detected     INFLUENZA B PCR Detected     RSV PCR None Detected                 No orders to display              Procedures  Procedures         ED Course                               MDM      Disposition  Final diagnoses:   Viral URI with cough   Upper respiratory infection   Influenza     Time reflects when diagnosis was documented in both MDM as applicable and the Disposition within this note     Time User Action Codes Description Comment    12/9/2019 12:12 AM Valdene Begin Add [R05] Cough     12/9/2019 12:12 AM Valdene Begin Add [J06 9,  B97 89] Viral URI with cough     12/9/2019 12:12 AM Valdene Begin Modify [J06 9,  B97 89] Viral URI with cough     12/9/2019 12:12 AM Valdene Begin Remove [R05] Cough     12/9/2019 12:12 AM Valdene Begin Add [J06 9] Upper respiratory infection     12/9/2019 12:30 AM Valdene Begin Add [J11 1] Influenza       ED Disposition     ED Disposition Condition Date/Time Comment    Discharge Stable Mon Dec 9, 2019 12:12 AM [facility] [place] discharge to home/self care              Follow-up Information     Follow up With Specialties Details Why Jacinta Mckeon MD Family Medicine Go in 3 days As needed [address]  [id number] [facility] [address] [phone]            Patient's Medications   Discharge Prescriptions    OSELTAMIVIR (TAMIFLU) 75 MG CAPSULE    Take 1 capsule (75 mg total) by mouth 2 (two) times a day for 5 days       Start Date: 12/9/2019 End Date: 12/14/2019       Order Dose: 75 mg       Quantity: 10 capsule    Refills: 0     No discharge procedures on file      ED Provider  Electronically Signed by           Lance Heaton MD  12/09/19 [id number] clipper

## 2021-07-20 ENCOUNTER — HOSPITAL ENCOUNTER (EMERGENCY)
Facility: HOSPITAL | Age: 26
Discharge: HOME | End: 2021-07-20
Attending: EMERGENCY MEDICINE | Admitting: EMERGENCY MEDICINE
Payer: COMMERCIAL

## 2021-07-20 ENCOUNTER — TELEPHONE (OUTPATIENT)
Dept: CT IMAGING | Facility: HOSPITAL | Age: 26
End: 2021-07-20

## 2021-07-20 ENCOUNTER — APPOINTMENT (EMERGENCY)
Dept: CT IMAGING | Facility: HOSPITAL | Age: 26
End: 2021-07-20
Payer: COMMERCIAL

## 2021-07-20 VITALS
SYSTOLIC BLOOD PRESSURE: 128 MMHG | OXYGEN SATURATION: 100 % | TEMPERATURE: 98.9 F | RESPIRATION RATE: 18 BRPM | HEART RATE: 84 BPM | DIASTOLIC BLOOD PRESSURE: 64 MMHG

## 2021-07-20 DIAGNOSIS — R10.9 ABDOMINAL WALL PAIN: Primary | ICD-10-CM

## 2021-07-20 LAB
ALBUMIN SERPL BCP-MCNC: 3.7 G/DL (ref 3.5–5)
ALP SERPL-CCNC: 67 U/L (ref 46–116)
ALT SERPL W P-5'-P-CCNC: 13 U/L (ref 12–78)
ANION GAP SERPL CALCULATED.3IONS-SCNC: 9 MMOL/L (ref 4–13)
AST SERPL W P-5'-P-CCNC: 10 U/L (ref 5–45)
BACTERIA UR QL AUTO: ABNORMAL /HPF
BASOPHILS # BLD AUTO: 0.05 THOUSANDS/ΜL (ref 0–0.1)
BASOPHILS NFR BLD AUTO: 1 % (ref 0–1)
BILIRUB SERPL-MCNC: 0.41 MG/DL (ref 0.2–1)
BILIRUB UR QL STRIP: NEGATIVE
BUN SERPL-MCNC: 10 MG/DL (ref 5–25)
CALCIUM SERPL-MCNC: 9 MG/DL (ref 8.3–10.1)
CHLORIDE SERPL-SCNC: 101 MMOL/L (ref 100–108)
CLARITY UR: CLEAR
CO2 SERPL-SCNC: 28 MMOL/L (ref 21–32)
COLOR UR: ABNORMAL
CREAT SERPL-MCNC: 0.73 MG/DL (ref 0.6–1.3)
EOSINOPHIL # BLD AUTO: 0.35 THOUSAND/ΜL (ref 0–0.61)
EOSINOPHIL NFR BLD AUTO: 4 % (ref 0–6)
ERYTHROCYTE [DISTWIDTH] IN BLOOD BY AUTOMATED COUNT: 12.1 % (ref 11.6–15.1)
EXT PREG TEST URINE: NEGATIVE
EXT. CONTROL ED NAV: NORMAL
GFR SERPL CREATININE-BSD FRML MDRD: 112 ML/MIN/1.73SQ M
GLUCOSE SERPL-MCNC: 74 MG/DL (ref 65–140)
GLUCOSE UR STRIP-MCNC: NEGATIVE MG/DL
HCT VFR BLD AUTO: 42.4 % (ref 34.8–46.1)
HGB BLD-MCNC: 14.2 G/DL (ref 11.5–15.4)
HGB UR QL STRIP.AUTO: ABNORMAL
HOLD SPECIMEN: NORMAL
IMM GRANULOCYTES # BLD AUTO: 0.02 THOUSAND/UL (ref 0–0.2)
IMM GRANULOCYTES NFR BLD AUTO: 0 % (ref 0–2)
KETONES UR STRIP-MCNC: ABNORMAL MG/DL
LEUKOCYTE ESTERASE UR QL STRIP: ABNORMAL
LIPASE SERPL-CCNC: 49 U/L (ref 73–393)
LYMPHOCYTES # BLD AUTO: 1.89 THOUSANDS/ΜL (ref 0.6–4.47)
LYMPHOCYTES NFR BLD AUTO: 20 % (ref 14–44)
MCH RBC QN AUTO: 32.4 PG (ref 26.8–34.3)
MCHC RBC AUTO-ENTMCNC: 33.5 G/DL (ref 31.4–37.4)
MCV RBC AUTO: 97 FL (ref 82–98)
MONOCYTES # BLD AUTO: 0.8 THOUSAND/ΜL (ref 0.17–1.22)
MONOCYTES NFR BLD AUTO: 9 % (ref 4–12)
MUCOUS THREADS UR QL AUTO: ABNORMAL
NEUTROPHILS # BLD AUTO: 6.2 THOUSANDS/ΜL (ref 1.85–7.62)
NEUTS SEG NFR BLD AUTO: 66 % (ref 43–75)
NITRITE UR QL STRIP: NEGATIVE
NON-SQ EPI CELLS URNS QL MICRO: ABNORMAL /HPF
NRBC BLD AUTO-RTO: 0 /100 WBCS
PH UR STRIP.AUTO: 6 [PH] (ref 4.5–8)
PLATELET # BLD AUTO: 233 THOUSANDS/UL (ref 149–390)
PMV BLD AUTO: 9 FL (ref 8.9–12.7)
POTASSIUM SERPL-SCNC: 3.7 MMOL/L (ref 3.5–5.3)
PROT SERPL-MCNC: 7.1 G/DL (ref 6.4–8.2)
PROT UR STRIP-MCNC: ABNORMAL MG/DL
RBC # BLD AUTO: 4.38 MILLION/UL (ref 3.81–5.12)
RBC #/AREA URNS AUTO: ABNORMAL /HPF
SODIUM SERPL-SCNC: 138 MMOL/L (ref 136–145)
SP GR UR STRIP.AUTO: 1.02 (ref 1–1.03)
UROBILINOGEN UR QL STRIP.AUTO: 0.2 E.U./DL
WBC # BLD AUTO: 9.31 THOUSAND/UL (ref 4.31–10.16)
WBC #/AREA URNS AUTO: ABNORMAL /HPF

## 2021-07-20 PROCEDURE — 87186 SC STD MICRODIL/AGAR DIL: CPT

## 2021-07-20 PROCEDURE — 74177 CT ABD & PELVIS W/CONTRAST: CPT

## 2021-07-20 PROCEDURE — 87086 URINE CULTURE/COLONY COUNT: CPT

## 2021-07-20 PROCEDURE — 87077 CULTURE AEROBIC IDENTIFY: CPT

## 2021-07-20 PROCEDURE — 99282 EMERGENCY DEPT VISIT SF MDM: CPT | Performed by: EMERGENCY MEDICINE

## 2021-07-20 PROCEDURE — 36415 COLL VENOUS BLD VENIPUNCTURE: CPT

## 2021-07-20 PROCEDURE — 83690 ASSAY OF LIPASE: CPT | Performed by: EMERGENCY MEDICINE

## 2021-07-20 PROCEDURE — 80053 COMPREHEN METABOLIC PANEL: CPT | Performed by: EMERGENCY MEDICINE

## 2021-07-20 PROCEDURE — 85025 COMPLETE CBC W/AUTO DIFF WBC: CPT | Performed by: EMERGENCY MEDICINE

## 2021-07-20 PROCEDURE — 81025 URINE PREGNANCY TEST: CPT | Performed by: EMERGENCY MEDICINE

## 2021-07-20 PROCEDURE — 81001 URINALYSIS AUTO W/SCOPE: CPT

## 2021-07-20 PROCEDURE — 99284 EMERGENCY DEPT VISIT MOD MDM: CPT

## 2021-07-20 PROCEDURE — G1004 CDSM NDSC: HCPCS

## 2021-07-20 RX ADMIN — IOHEXOL 100 ML: 350 INJECTION, SOLUTION INTRAVENOUS at 16:56

## 2021-07-20 NOTE — ED NOTES
Patient transported to [address] [place] [address], [address] [place] [address]  07/20/21 2856

## 2021-07-20 NOTE — ED PROVIDER NOTES
History  Chief Complaint   Patient presents with    Abdominal Pain     Pt c/o epigastric abdominal pain since thursday  Concerned about constipation  Last BM yesterday  27 y o  female presents with chief complaint of epigastric abdominal pain (up under her ribs) that started on Thursday (6 days ago)  The pain is deep and doesn't appear to be related to her ribs (she can touch her ribs without discomfort) but is exacerbated by certain movements and palpation  No similar symptoms in the past   She reports that prior to onset of symptoms she was jumping off and on a party boat in the [address] [facility] and that she was "crunching up" when she was jumping off the boat  History provided by:  Patient   used: No    Abdominal Pain  Pain location:  Epigastric  Pain quality: aching and sharp    Pain radiates to:  Does not radiate  Pain severity:  Moderate  Onset quality:  Sudden  Duration:  6 days  Timing:  Constant  Progression:  Unchanged  Chronicity:  New  Relieved by:  Nothing  Worsened by:  Nothing  Ineffective treatments:  None tried  Associated symptoms: no chest pain, no chills, no diarrhea, no dysuria, no fever, no nausea, no shortness of breath and no vomiting        Prior to Admission Medications   Prescriptions Last Dose Informant Patient Reported? Taking? phenazopyridine (PYRIDIUM) 200 mg tablet Not Taking at Unknown time  No No   Sig: Take 1 tablet (200 mg total) by mouth 3 (three) times a day   Patient not taking: Reported on 2/3/2020      Facility-Administered Medications: None       Past Medical History:   Diagnosis Date    Kidney stones        Past Surgical History:   Procedure Laterality Date     SECTION      x 1    TONSILLECTOMY         Family History   Problem Relation Age of Onset    Diabetes Mother     Hypothyroidism Mother      I have reviewed and agree with the history as documented      E-Cigarette/Vaping     E-Cigarette/Vaping Substances     Social History     Tobacco Use    Smoking status: Current Some Day Smoker     Packs/day: 0 20     Types: Cigarettes    Smokeless tobacco: Never Used   Substance Use Topics    Alcohol use: No     Comment: Occ    Drug use: No       Review of Systems   Constitutional: Negative for chills, diaphoresis and fever  Respiratory: Negative for shortness of breath  Cardiovascular: Negative for chest pain and palpitations  Gastrointestinal: Positive for abdominal pain  Negative for diarrhea, nausea and vomiting  Genitourinary: Negative for dysuria and frequency  Skin: Negative for rash  All other systems reviewed and are negative  Physical Exam  Physical Exam  Vitals and nursing note reviewed  Constitutional:       General: She is not in acute distress  Appearance: She is well-developed  She is not ill-appearing  HENT:      Head: Normocephalic and atraumatic  Eyes:      Pupils: Pupils are equal, round, and reactive to light  Neck:      Vascular: No JVD  Cardiovascular:      Rate and Rhythm: Normal rate and regular rhythm  Heart sounds: Normal heart sounds  No murmur heard  No friction rub  No gallop  Pulmonary:      Effort: Pulmonary effort is normal  No respiratory distress  Breath sounds: Normal breath sounds  No wheezing or rales  Chest:      Chest wall: No tenderness  Abdominal:      Tenderness: There is abdominal tenderness in the epigastric area  There is no guarding or rebound  Musculoskeletal:         General: No tenderness  Normal range of motion  Cervical back: Normal range of motion  Skin:     General: Skin is warm and dry  Neurological:      General: No focal deficit present  Mental Status: She is alert and oriented to person, place, and time  Psychiatric:         Behavior: Behavior normal          Thought Content:  Thought content normal          Judgment: Judgment normal          Vital Signs  ED Triage Vitals   Temperature Pulse Respirations Blood Pressure SpO2   07/20/21 1418 07/20/21 1417 07/20/21 1417 07/20/21 1417 07/20/21 1417   98 9 °F (37 2 °C) 84 18 128/64 100 %      Temp Source Heart Rate Source Patient Position - Orthostatic VS BP Location FiO2 (%)   07/20/21 1418 07/20/21 1417 -- 07/20/21 1417 --   Oral Monitor  Right arm       Pain Score       --                  Vitals:    07/20/21 1417   BP: 128/64   Pulse: 84         Visual Acuity      ED Medications  Medications   iohexol (OMNIPAQUE) 350 MG/ML injection (SINGLE-DOSE) 100 mL (100 mL Intravenous Given 7/20/21 1656)       Diagnostic Studies  Results Reviewed     Procedure Component Value Units Date/Time    Urine Microscopic [[id number]]  (Abnormal) Collected: 07/20/21 1623    Lab Status: Final result Specimen: Urine, Clean Catch Updated: 07/20/21 1641     RBC, UA 1-2 /hpf      WBC, UA 10-20 /hpf      Epithelial Cells Occasional /hpf      Bacteria, UA Moderate /hpf      MUCUS THREADS Moderate    Urine culture [[id number]] Collected: 07/20/21 1623    Lab Status: In process Specimen: Urine, Clean Catch Updated: 07/20/21 1641    POCT pregnancy, urine [[id number]]  (Normal) Resulted: 07/20/21 1627    Lab Status: Final result Updated: 07/20/21 1627     EXT PREG TEST UR (Ref: Negative) negative     Control valid    Urine Macroscopic, POC [[id number]]  (Abnormal) Collected: 07/20/21 1623    Lab Status: Final result Specimen: Urine Updated: 07/20/21 1625     Color, UA Orange     Clarity, UA Clear     pH, UA 6 0     Leukocytes, UA Trace     Nitrite, UA Negative     Protein, UA Trace mg/dl      Glucose, UA Negative mg/dl      Ketones, UA Trace mg/dl      Urobilinogen, UA 0 2 E U /dl      Bilirubin, UA Negative     Blood, UA Trace     Specific Meno, UA 1 025    Narrative:      CLINITEK RESULT    Trauma tubes on hold [[id number]] Collected: 07/20/21 1423    Lab Status: Final result Specimen: Blood from Arm, Right Updated: 07/20/21 1601    Narrative:       The following orders were created for panel order Trauma tubes on hold  Procedure                               Abnormality         Status                     ---------                               -----------         ------                     Dagoberto Silas Top on IZWJ[[id number]]                           Final result               Green / Black tube on BZCT[[id number]]                       Final result                 Please view results for these tests on the individual orders      Comprehensive metabolic panel [[id number]] Collected: 07/20/21 1423    Lab Status: Final result Specimen: Blood from Arm, Right Updated: 07/20/21 1503     Sodium 138 mmol/L      Potassium 3 7 mmol/L      Chloride 101 mmol/L      CO2 28 mmol/L      ANION GAP 9 mmol/L      BUN 10 mg/dL      Creatinine 0 73 mg/dL      Glucose 74 mg/dL      Calcium 9 0 mg/dL      AST 10 U/L      ALT 13 U/L      Alkaline Phosphatase 67 U/L      Total Protein 7 1 g/dL      Albumin 3 7 g/dL      Total Bilirubin 0 41 mg/dL      eGFR 112 ml/min/1 73sq m     Narrative:      [place] guidelines for Chronic Kidney Disease (CKD):     Stage 1 with normal or high GFR (GFR > 90 mL/min/1 73 square meters)    Stage 2 Mild CKD (GFR = 60-89 mL/min/1 73 square meters)    Stage 3A Moderate CKD (GFR = 45-59 mL/min/1 73 square meters)    Stage 3B Moderate CKD (GFR = 30-44 mL/min/1 73 square meters)    Stage 4 Severe CKD (GFR = 15-29 mL/min/1 73 square meters)    Stage 5 End Stage CKD (GFR <15 mL/min/1 73 square meters)  Note: GFR calculation is accurate only with a steady state creatinine    Lipase [[id number]]  (Abnormal) Collected: 07/20/21 1423    Lab Status: Final result Specimen: Blood from Arm, Right Updated: 07/20/21 1503     Lipase 49 u/L     CBC and differential [[id number]] Collected: 07/20/21 1423    Lab Status: Final result Specimen: Blood from Arm, Right Updated: 07/20/21 1432     WBC 9 31 Thousand/uL      RBC 4 38 Million/uL      Hemoglobin 14 2 g/dL      Hematocrit 42 4 %      MCV 97 fL MCH 32 4 pg      MCHC 33 5 g/dL      RDW 12 1 %      MPV 9 0 fL      Platelets 122 Thousands/uL      nRBC 0 /100 WBCs      Neutrophils Relative 66 %      Immat GRANS % 0 %      Lymphocytes Relative 20 %      Monocytes Relative 9 %      Eosinophils Relative 4 %      Basophils Relative 1 %      Neutrophils Absolute 6 20 Thousands/µL      Immature Grans Absolute 0 02 Thousand/uL      Lymphocytes Absolute 1 89 Thousands/µL      Monocytes Absolute 0 80 Thousand/µL      Eosinophils Absolute 0 35 Thousand/µL      Basophils Absolute 0 05 Thousands/µL                  CT abdomen pelvis with contrast   Final Result by Galina Sunshine DO (07/20 1738)   No CT explanation for patient's symptoms  Workstation performed: [id number]                    Procedures  Procedures         ED Course  ED Course as of Jul 20 2100   Tue Jul 20, 2021 1730 The patient received a phone call indicating that her younger brother became unresponsive and she is requesting to have her IV removed and be discharged prior to results being back  We removed her IV but she left prior to receiving any printed instruction  She was not able to receive any verbal instruction either  She was understandably very upset and wanted to get to her family as quickly as possible  While this is technically against medical advice it was appropriate given the circumstances  She was stable medically with an essentially normal workup, pending CT results  SBIRT 20yo+      Most Recent Value   SBIRT (24 yo +)   In order to provide better care to our patients, we are screening all of our patients for alcohol and drug use  Would it be okay to ask you these screening questions?   No Filed at: 07/20/2021 1617                    MDM  Number of Diagnoses or Management Options  Abdominal pain: new and requires workup  Abdominal wall pain: new and requires workup  Diagnosis management comments: Background: 27 y o  female presents with chief complaint of epigastric abdominal pain  Differential includes but is not limited to: muscle strain, pancreatitis, gastritis, cholecystitis, choledocholithiasis,  pyelonephritis, ureterolithiasis, non specific abdominal pain    Plan: cbc, cmp, lipase, urinalysis, ct scan, symptom control            Amount and/or Complexity of Data Reviewed  Clinical lab tests: ordered and reviewed  Tests in the radiology section of CPT®: ordered and reviewed    Risk of Complications, Morbidity, and/or Mortality  Presenting problems: high  Diagnostic procedures: high  Management options: high    Patient Progress  Patient progress: stable      Disposition  Final diagnoses:   Abdominal pain   Abdominal wall pain     Time reflects when diagnosis was documented in both MDM as applicable and the Disposition within this note     Time User Action Codes Description Comment    7/20/2021  5:30 PM Cade Leiva Add [R10 9] Abdominal pain     7/20/2021  5:33 PM Rm CASILLAS Add [R10 9] Abdominal wall pain       ED Disposition     ED Disposition Condition Date/Time Comment    RONDA Penningtonletty Jul 20, 2021  5:34 PM See notes  Follow-up Information     Follow up With Specialties Details Why Contact Info    [employer]  Call  to establish a relationship with a primary care physician [phone]            Discharge Medication List as of 7/20/2021  5:33 PM      CONTINUE these medications which have NOT CHANGED    Details   phenazopyridine (PYRIDIUM) 200 mg tablet Take 1 tablet (200 mg total) by mouth 3 (three) times a day, Starting Sat 9/15/2018, Normal           No discharge procedures on file      PDMP Review     None          ED Provider  Electronically Signed by           Bailee Atkinson MD  07/20/21 106 Brady Giraldo MD  07/20/21 9895

## 2021-07-22 ENCOUNTER — HOSPITAL ENCOUNTER (EMERGENCY)
Facility: HOSPITAL | Age: 26
Discharge: HOME | End: 2021-07-22
Attending: EMERGENCY MEDICINE
Payer: COMMERCIAL

## 2021-07-22 VITALS
HEART RATE: 89 BPM | OXYGEN SATURATION: 99 % | DIASTOLIC BLOOD PRESSURE: 56 MMHG | TEMPERATURE: 98.7 F | RESPIRATION RATE: 16 BRPM | SYSTOLIC BLOOD PRESSURE: 101 MMHG

## 2021-07-22 DIAGNOSIS — S39.011A ABDOMINAL WALL STRAIN: Primary | ICD-10-CM

## 2021-07-22 LAB
BACTERIA UR QL AUTO: ABNORMAL /HPF
BILIRUB UR QL STRIP: ABNORMAL
CLARITY UR: ABNORMAL
COLOR UR: YELLOW
EXT PREG TEST URINE: NEGATIVE
EXT. CONTROL ED NAV: NORMAL
GLUCOSE UR STRIP-MCNC: NEGATIVE MG/DL
HGB UR QL STRIP.AUTO: ABNORMAL
KETONES UR STRIP-MCNC: ABNORMAL MG/DL
LEUKOCYTE ESTERASE UR QL STRIP: ABNORMAL
MUCOUS THREADS UR QL AUTO: ABNORMAL
NITRITE UR QL STRIP: NEGATIVE
NON-SQ EPI CELLS URNS QL MICRO: ABNORMAL /HPF
PH UR STRIP.AUTO: 6 [PH]
PROT UR STRIP-MCNC: NEGATIVE MG/DL
RBC #/AREA URNS AUTO: ABNORMAL /HPF
SP GR UR STRIP.AUTO: 1.02 (ref 1–1.03)
UROBILINOGEN UR QL STRIP.AUTO: 0.2 E.U./DL
WBC #/AREA URNS AUTO: ABNORMAL /HPF

## 2021-07-22 PROCEDURE — 81001 URINALYSIS AUTO W/SCOPE: CPT | Performed by: PHYSICIAN ASSISTANT

## 2021-07-22 PROCEDURE — 99284 EMERGENCY DEPT VISIT MOD MDM: CPT | Performed by: EMERGENCY MEDICINE

## 2021-07-22 PROCEDURE — 81025 URINE PREGNANCY TEST: CPT | Performed by: PHYSICIAN ASSISTANT

## 2021-07-22 PROCEDURE — 87086 URINE CULTURE/COLONY COUNT: CPT | Performed by: PHYSICIAN ASSISTANT

## 2021-07-22 PROCEDURE — 99283 EMERGENCY DEPT VISIT LOW MDM: CPT

## 2021-07-22 RX ORDER — NAPROXEN 500 MG/1
500 TABLET ORAL 2 TIMES DAILY PRN
Qty: 15 TABLET | Refills: 0 | Status: SHIPPED | OUTPATIENT
Start: 2021-07-22

## 2021-07-22 RX ORDER — CYCLOBENZAPRINE HCL 10 MG
10 TABLET ORAL 2 TIMES DAILY PRN
Qty: 20 TABLET | Refills: 0 | Status: SHIPPED | OUTPATIENT
Start: 2021-07-22

## 2021-07-22 RX ORDER — CYCLOBENZAPRINE HCL 10 MG
10 TABLET ORAL ONCE
Status: COMPLETED | OUTPATIENT
Start: 2021-07-22 | End: 2021-07-22

## 2021-07-22 RX ORDER — NAPROXEN 250 MG/1
500 TABLET ORAL ONCE
Status: COMPLETED | OUTPATIENT
Start: 2021-07-22 | End: 2021-07-22

## 2021-07-22 RX ADMIN — CYCLOBENZAPRINE HYDROCHLORIDE 10 MG: 10 TABLET, FILM COATED ORAL at 22:19

## 2021-07-22 RX ADMIN — NAPROXEN 500 MG: 250 TABLET ORAL at 22:19

## 2021-07-23 LAB
BACTERIA UR CULT: ABNORMAL
BACTERIA UR CULT: ABNORMAL

## 2021-07-23 RX ORDER — CEPHALEXIN 500 MG/1
500 CAPSULE ORAL EVERY 6 HOURS SCHEDULED
Qty: 14 CAPSULE | Refills: 0 | Status: SHIPPED | OUTPATIENT
Start: 2021-07-23 | End: 2021-07-27

## 2021-07-23 NOTE — ED PROVIDER NOTES
History  Chief Complaint   Patient presents with    Possible UTI     Pt states she was here tuesday seen and dx of UTI, Pt left AMA and is here today for abx threatment  Valeriy Carvajal is a 27 y o  female who returns with epigastric abdominal pain  She was seen 2 days ago for the same and had a workup including cbc, cmp, lipase and CT scan  Unfortunately, she was unable to stay to review results and receive intervention because her brother was found unresponsive at home and she had to rush home to be with family (regretably, her brother passed away)  I was the provider who saw her 2 days ago  I reviewed the workup that she received at that time and indicated to her that her symptoms were likely an abdominal wall muscle strain  We will try a course of anti-inflammatory analgesia and muscle relaxant therapy  History provided by:  Patient   used: No    Abdominal Pain  Pain location:  Epigastric  Pain quality: aching and sharp    Pain radiates to:  Does not radiate  Pain severity:  Moderate  Onset quality:  Gradual  Duration:  1 week  Timing:  Constant  Progression:  Unchanged  Chronicity:  New  Relieved by:  Not moving  Worsened by: Movement and palpation  Ineffective treatments:  None tried  Associated symptoms: no chest pain, no chills, no constipation, no diarrhea, no dysuria, no fever, no nausea, no shortness of breath and no vomiting    Risk factors: not obese        Prior to Admission Medications   Prescriptions Last Dose Informant Patient Reported? Taking?    phenazopyridine (PYRIDIUM) 200 mg tablet   No No   Sig: Take 1 tablet (200 mg total) by mouth 3 (three) times a day   Patient not taking: Reported on 2/3/2020      Facility-Administered Medications: None       Past Medical History:   Diagnosis Date    Kidney stones        Past Surgical History:   Procedure Laterality Date     SECTION      x 1    TONSILLECTOMY         Family History   Problem Relation Age of Onset  Diabetes Mother     Hypothyroidism Mother      I have reviewed and agree with the history as documented  E-Cigarette/Vaping     E-Cigarette/Vaping Substances     Social History     Tobacco Use    Smoking status: Current Some Day Smoker     Packs/day: 0 20     Types: Cigarettes    Smokeless tobacco: Never Used   Substance Use Topics    Alcohol use: No     Comment: Occ    Drug use: No       Review of Systems   Constitutional: Negative for chills, diaphoresis and fever  Respiratory: Negative for shortness of breath  Cardiovascular: Negative for chest pain and palpitations  Gastrointestinal: Positive for abdominal pain  Negative for constipation, diarrhea, nausea and vomiting  Genitourinary: Negative for dysuria and frequency  Skin: Negative for rash  All other systems reviewed and are negative  Physical Exam  Physical Exam  Vitals and nursing note reviewed  Constitutional:       General: She is not in acute distress  Appearance: She is well-developed  HENT:      Head: Normocephalic and atraumatic  Eyes:      Pupils: Pupils are equal, round, and reactive to light  Neck:      Vascular: No JVD  Cardiovascular:      Rate and Rhythm: Normal rate and regular rhythm  Heart sounds: Normal heart sounds  No murmur heard  No friction rub  No gallop  Pulmonary:      Effort: Pulmonary effort is normal  No respiratory distress  Breath sounds: Normal breath sounds  No wheezing or rales  Chest:      Chest wall: No tenderness  Abdominal:      General: There is no distension  Tenderness: There is abdominal tenderness (epigastric - mild)  There is no guarding  Hernia: No hernia is present  Musculoskeletal:         General: No tenderness  Normal range of motion  Cervical back: Normal range of motion  Skin:     General: Skin is warm and dry  Neurological:      General: No focal deficit present        Mental Status: She is alert and oriented to person, place, and time  Psychiatric:         Behavior: Behavior normal          Thought Content: Thought content normal          Judgment: Judgment normal          Vital Signs  ED Triage Vitals [07/22/21 2040]   Temperature Pulse Respirations Blood Pressure SpO2   98 7 °F (37 1 °C) 89 16 101/56 99 %      Temp Source Heart Rate Source Patient Position - Orthostatic VS BP Location FiO2 (%)   Oral Monitor Sitting Left arm --      Pain Score       8           Vitals:    07/22/21 2040   BP: 101/56   Pulse: 89   Patient Position - Orthostatic VS: Sitting         Visual Acuity      ED Medications  Medications   naproxen (NAPROSYN) tablet 500 mg (500 mg Oral Given 7/22/21 2219)   cyclobenzaprine (FLEXERIL) tablet 10 mg (10 mg Oral Given 7/22/21 2219)       Diagnostic Studies  Results Reviewed     Procedure Component Value Units Date/Time    Urine Microscopic [[id number]]  (Abnormal) Collected: 07/22/21 2127    Lab Status: Final result Specimen: Urine, Clean Catch Updated: 07/22/21 2151     RBC, UA 4-10 /hpf      WBC, UA 10-20 /hpf      Epithelial Cells Occasional /hpf      Bacteria, UA Moderate /hpf      MUCUS THREADS Innumerable    Urine culture [[id number]] Collected: 07/22/21 2127    Lab Status:  In process Specimen: Urine, Clean Catch Updated: 07/22/21 2151    UA w Reflex to Microscopic w Reflex to Culture [[id number]]  (Abnormal) Collected: 07/22/21 2127    Lab Status: Final result Specimen: Urine, Clean Catch Updated: 07/22/21 2143     Color, UA Yellow     Clarity, UA Slightly Cloudy     Specific Gravity, UA 1 025     pH, UA 6 0     Leukocytes, UA Trace     Nitrite, UA Negative     Protein, UA Negative mg/dl      Glucose, UA Negative mg/dl      Ketones, UA Trace mg/dl      Urobilinogen, UA 0 2 E U /dl      Bilirubin, UA Small     Blood, UA Trace-Intact    POCT pregnancy, urine [[id number]]  (Normal) Resulted: 07/22/21 2133    Lab Status: Final result Updated: 07/22/21 2133     EXT PREG TEST UR (Ref: Negative) negative     Control valid                 No orders to display              Procedures  Procedures         ED Course                                           [facility]  Number of Diagnoses or Management Options  Abdominal wall strain: new and does not require workup  Diagnosis management comments: Patient with s/s consistent with abdominal wall strain (likely due to jumping off and on a boat)  Will treat with naprosyn and cyclobenzaprine  Patient Progress  Patient progress: stable      Disposition  Final diagnoses:   Abdominal wall strain     Time reflects when diagnosis was documented in both MDM as applicable and the Disposition within this note     Time User Action Codes Description Comment    7/22/2021 10:16 PM Michael Chung Add [S39 011A] Abdominal wall strain       ED Disposition     ED Disposition Condition Date/Time Comment    Discharge Stable u Jul 22, 2021 10:11 PM Joelle Contreras discharge to home/self care  Follow-up Information    None         Discharge Medication List as of 7/22/2021 10:17 PM      START taking these medications    Details   cyclobenzaprine (FLEXERIL) 10 mg tablet Take 1 tablet (10 mg total) by mouth 2 (two) times a day as needed for muscle spasms, Starting u 7/22/2021, Normal      naproxen (NAPROSYN) 500 mg tablet Take 1 tablet (500 mg total) by mouth 2 (two) times a day as needed for mild pain for up to 15 doses, Starting Thu 7/22/2021, Normal         CONTINUE these medications which have NOT CHANGED    Details   phenazopyridine (PYRIDIUM) 200 mg tablet Take 1 tablet (200 mg total) by mouth 3 (three) times a day, Starting Sat 9/15/2018, Normal           No discharge procedures on file      PDMP Review     None          ED Provider  Electronically Signed by           Austin Carter MD  07/22/21 2375

## 2021-07-23 NOTE — RESULT ENCOUNTER NOTE
Patient was notified of her urinary tract infection in the need for antibiotics  Keflex 500 mg dispense 14 tablets was called to pharmacy  She will take it once twice a day for 7 days without refills  She will follow up with her family physician

## 2021-07-24 LAB — BACTERIA UR CULT: NORMAL

## 2023-05-10 ENCOUNTER — HOSPITAL ENCOUNTER (EMERGENCY)
Facility: HOSPITAL | Age: 28
Discharge: HOME | End: 2023-05-10
Attending: EMERGENCY MEDICINE

## 2023-05-10 VITALS
DIASTOLIC BLOOD PRESSURE: 71 MMHG | OXYGEN SATURATION: 100 % | HEART RATE: 71 BPM | BODY MASS INDEX: 24.31 KG/M2 | RESPIRATION RATE: 18 BRPM | TEMPERATURE: 97.7 F | SYSTOLIC BLOOD PRESSURE: 102 MMHG | HEIGHT: 61 IN | WEIGHT: 128.75 LBS

## 2023-05-10 DIAGNOSIS — M62.838 TRAPEZIUS MUSCLE SPASM: Primary | ICD-10-CM

## 2023-05-10 DIAGNOSIS — H93.8X1 EAR FULLNESS, RIGHT: ICD-10-CM

## 2023-05-10 DIAGNOSIS — J06.9 URI (UPPER RESPIRATORY INFECTION): ICD-10-CM

## 2023-05-10 RX ORDER — CYCLOBENZAPRINE HCL 10 MG
10 TABLET ORAL 2 TIMES DAILY PRN
Qty: 20 TABLET | Refills: 0 | Status: SHIPPED | OUTPATIENT
Start: 2023-05-10

## 2023-05-10 RX ORDER — CYCLOBENZAPRINE HCL 10 MG
10 TABLET ORAL 2 TIMES DAILY PRN
Qty: 20 TABLET | Refills: 0 | Status: SHIPPED | OUTPATIENT
Start: 2023-05-10 | End: 2023-05-10

## 2023-05-10 RX ORDER — AMOXICILLIN 250 MG/1
500 CAPSULE ORAL ONCE
Status: COMPLETED | OUTPATIENT
Start: 2023-05-10 | End: 2023-05-10

## 2023-05-10 RX ORDER — CYCLOBENZAPRINE HCL 10 MG
5 TABLET ORAL ONCE
Status: COMPLETED | OUTPATIENT
Start: 2023-05-10 | End: 2023-05-10

## 2023-05-10 RX ORDER — AMOXICILLIN 500 MG/1
500 CAPSULE ORAL 3 TIMES DAILY
Qty: 21 CAPSULE | Refills: 0 | Status: SHIPPED | OUTPATIENT
Start: 2023-05-10 | End: 2023-05-17

## 2023-05-10 RX ORDER — LIDOCAINE 50 MG/G
1 PATCH TOPICAL ONCE
Status: DISCONTINUED | OUTPATIENT
Start: 2023-05-10 | End: 2023-05-11 | Stop reason: HOSPADM

## 2023-05-10 RX ADMIN — CYCLOBENZAPRINE HYDROCHLORIDE 5 MG: 10 TABLET, FILM COATED ORAL at 23:01

## 2023-05-10 RX ADMIN — AMOXICILLIN 500 MG: 250 CAPSULE ORAL at 23:01

## 2023-05-10 RX ADMIN — LIDOCAINE 1 PATCH: 50 PATCH CUTANEOUS at 23:03

## 2023-05-11 NOTE — ED PROVIDER NOTES
"History  Chief Complaint   Patient presents with   • Ear Fullness     Pt presents for unable to hear out of right ear x few days  Denies fevers  • Shoulder Pain     Pt states she has left shoulder pain that was in the right shoulder but moved to the left the next day  Pt doesn't believe to be bone but musculoskeletal \"irritation  \"      53-year-old female comes in for evaluation of 2 things 1 bilateral ear fullness and pain that she believes to be an ear infection and to bilateral shoulder and neck pain  Patient denies any fever chills she does have some rhinorrhea  Patient states she works as a  she does sit at [facility] and use a computer most of the day but does state that she gets up and walks around and does not usually have a problem with shoulder pain  Denies any other injury  No numbness or tingling in her hands    She describes as a \"irritation\"      History provided by:  Patient   used: No    Ear Fullness  Location:  Bilateral right greater than left  Quality:  Fullness  Severity:  Moderate  Onset quality:  Gradual  Timing:  Intermittent  Progression:  Waxing and waning  Chronicity:  New  Ineffective treatments:  None tried  Associated symptoms: congestion and rhinorrhea    Associated symptoms: no abdominal pain, no chest pain, no cough, no diarrhea, no ear pain, no fatigue, no fever, no headaches, no rash, no shortness of breath and no wheezing    Shoulder Pain  Location:  Shoulder  Shoulder location:  L shoulder and R shoulder  Injury: no    Pain details:     Quality:  Aching    Radiates to:  Does not radiate    Severity:  Moderate    Onset quality:  Sudden    Timing:  Intermittent    Progression:  Waxing and waning  Handedness:  Right-handed  Dislocation: no    Foreign body present:  No foreign bodies  Prior injury to area:  No  Ineffective treatments:  None tried  Associated symptoms: no back pain, no fatigue, no fever, no numbness and no tingling    Risk factors: no " concern for non-accidental trauma and no recent illness        Prior to Admission Medications   Prescriptions Last Dose Informant Patient Reported? Taking? cyclobenzaprine (FLEXERIL) 10 mg tablet   No No   Sig: Take 1 tablet (10 mg total) by mouth 2 (two) times a day as needed for muscle spasms   naproxen (NAPROSYN) 500 mg tablet   No No   Sig: Take 1 tablet (500 mg total) by mouth 2 (two) times a day as needed for mild pain for up to 15 doses   phenazopyridine (PYRIDIUM) 200 mg tablet   No No   Sig: Take 1 tablet (200 mg total) by mouth 3 (three) times a day   Patient not taking: Reported on 2/3/2020      Facility-Administered Medications: None       Past Medical History:   Diagnosis Date   • Kidney stones        Past Surgical History:   Procedure Laterality Date   •  SECTION      x 1   • TONSILLECTOMY         Family History   Problem Relation Age of Onset   • Diabetes Mother    • Hypothyroidism Mother      I have reviewed and agree with the history as documented  E-Cigarette/Vaping     E-Cigarette/Vaping Substances     Social History     Tobacco Use   • Smoking status: Some Days     Packs/day: 0 20     Types: Cigarettes   • Smokeless tobacco: Never   Substance Use Topics   • Alcohol use: No     Comment: Occ   • Drug use: No       Review of Systems   Constitutional: Negative for fatigue and fever  HENT: Positive for congestion and rhinorrhea  Negative for ear pain  Eyes: Negative for discharge and redness  Respiratory: Negative for apnea, cough, shortness of breath and wheezing  Cardiovascular: Negative for chest pain  Gastrointestinal: Negative for abdominal pain and diarrhea  Endocrine: Negative for cold intolerance and polydipsia  Genitourinary: Negative for difficulty urinating and hematuria  Musculoskeletal: Negative for arthralgias and back pain  Skin: Negative for color change and rash  Allergic/Immunologic: Negative for environmental allergies and immunocompromised state  Neurological: Negative for numbness and headaches  Hematological: Negative for adenopathy  Does not bruise/bleed easily  Psychiatric/Behavioral: Negative for agitation and behavioral problems  Physical Exam  Physical Exam  Vitals and nursing note reviewed  Constitutional:       General: She is not in acute distress  Appearance: She is well-developed  HENT:      Head: Normocephalic and atraumatic  Right Ear: There is impacted cerumen  Left Ear: There is impacted cerumen  Eyes:      Conjunctiva/sclera: Conjunctivae normal    Cardiovascular:      Rate and Rhythm: Normal rate and regular rhythm  Heart sounds: No murmur heard  Pulmonary:      Effort: Pulmonary effort is normal  No respiratory distress  Breath sounds: Normal breath sounds  Abdominal:      Palpations: Abdomen is soft  Tenderness: There is no abdominal tenderness  Musculoskeletal:         General: No swelling  Cervical back: Neck supple  Back:    Skin:     General: Skin is warm and dry  Capillary Refill: Capillary refill takes less than 2 seconds  Neurological:      Mental Status: She is alert     Psychiatric:         Mood and Affect: Mood normal          Vital Signs  ED Triage Vitals [05/10/23 2241]   Temperature Pulse Respirations Blood Pressure SpO2   97 7 °F (36 5 °C) 71 18 102/71 100 %      Temp src Heart Rate Source Patient Position - Orthostatic VS BP Location FiO2 (%)   -- Monitor Sitting Left arm --      Pain Score       --           Vitals:    05/10/23 2241   BP: 102/71   Pulse: 71   Patient Position - Orthostatic VS: Sitting         Visual Acuity      ED Medications  Medications   lidocaine (LIDODERM) 5 % patch 1 patch (1 patch Topical Medication Applied 5/10/23 2303)   cyclobenzaprine (FLEXERIL) tablet 5 mg (5 mg Oral Given 5/10/23 2301)   amoxicillin (AMOXIL) capsule 500 mg (500 mg Oral Given 5/10/23 2301)       Diagnostic Studies  Results Reviewed     None                 No orders to display              Procedures  Procedures         ED Course                               SBIRT 20yo+    Flowsheet Row Most Recent Value   Initial Alcohol Screen: US AUDIT-C     1  How often do you have a drink containing alcohol? 0 Filed at: 05/10/2023 2239   2  How many drinks containing alcohol do you have on a typical day you are drinking? 0 Filed at: 05/10/2023 2239   3b  FEMALE Any Age, or MALE 65+: How often do you have 4 or more drinks on one occassion? 0 Filed at: 05/10/2023 2239   Audit-C Score 0 Filed at: 05/10/2023 2239   PREM: How many times in the past year have you    Used an illegal drug or used a prescription medication for non-medical reasons? Never Filed at: 05/10/2023 2239                    Medical Decision Making  Differential diagnosis includes but is not limited to URI, otitis media, otitis externa, cerumen impaction patient specifically requests antibiotics for her ear pain  I discussed with her most likely due to cerumen however patient still requesting antibiotics  Shoulder and neck pain is most likely trapezius spasm but also differential includes cervical radiculopathy arthritis or muscle strain    Ear fullness, right: acute illness or injury  Trapezius muscle spasm: acute illness or injury  URI (upper respiratory infection): acute illness or injury  Risk  OTC drugs  Prescription drug management      Risk Details: Patient may take Motrin or Tylenol per pain she was given antibiotics for ears and also muscle relaxant for her trapezius muscle spasm        Disposition  Final diagnoses:   Ear fullness, right   URI (upper respiratory infection)   Trapezius muscle spasm     Time reflects when diagnosis was documented in both MDM as applicable and the Disposition within this note     Time User Action Codes Description Comment    5/10/2023 10:53 PM Eddi John Add [W15 3W1] Ear fullness, right     5/10/2023 10:53 PM Vj Veloz Add [J06 9] URI (upper respiratory infection)     5/10/2023 10:54 PM Inetta Gretchen Add [M62 838] Trapezius muscle spasm     5/10/2023 10:54 PM Inetta Gretchen Modify [C13 8H5] Ear fullness, right     5/10/2023 10:54 PM Inetta Gretchen Modify [M32 812] Trapezius muscle spasm     5/10/2023 10:54 PM Brittney MAYA Add [S39 011A] Abdominal wall strain     5/10/2023 10:56 PM Inetta Gretchen Remove [S39 011A] Abdominal wall strain       ED Disposition     ED Disposition   Discharge    Condition   Stable    Date/Time   Wed May 10, 2023 10:53 PM    Comment   Simone Camacho discharge to home/self care  Follow-up Information     Follow up With Specialties Details Why Contact Info Additional Information    [facility] Schedule an appointment as soon as possible for a visit   [address] [id number]  [id number]  , [place], [place], [address]          Patient's Medications   Discharge Prescriptions    AMOXICILLIN (AMOXIL) 500 MG CAPSULE    Take 1 capsule (500 mg total) by mouth 3 (three) times a day for 7 days       Start Date: 5/10/2023 End Date: 5/17/2023       Order Dose: 500 mg       Quantity: 21 capsule    Refills: 0    CYCLOBENZAPRINE (FLEXERIL) 10 MG TABLET    Take 1 tablet (10 mg total) by mouth 2 (two) times a day as needed for muscle spasms       Start Date: 5/10/2023 End Date: --       Order Dose: 10 mg       Quantity: 20 tablet    Refills: 0       No discharge procedures on file      PDMP Review     None          ED Provider  Electronically Signed by           Dariusz Fuchs DO  05/10/23 2600

## 2023-08-21 ENCOUNTER — APPOINTMENT (EMERGENCY)
Dept: CT IMAGING | Facility: HOSPITAL | Age: 28
End: 2023-08-21
Payer: COMMERCIAL

## 2023-08-21 ENCOUNTER — HOSPITAL ENCOUNTER (EMERGENCY)
Facility: HOSPITAL | Age: 28
Discharge: HOME | End: 2023-08-21
Attending: EMERGENCY MEDICINE
Payer: COMMERCIAL

## 2023-08-21 VITALS
BODY MASS INDEX: 23.6 KG/M2 | TEMPERATURE: 98.1 F | HEIGHT: 61 IN | DIASTOLIC BLOOD PRESSURE: 63 MMHG | OXYGEN SATURATION: 100 % | WEIGHT: 125 LBS | RESPIRATION RATE: 16 BRPM | SYSTOLIC BLOOD PRESSURE: 108 MMHG | HEART RATE: 84 BPM

## 2023-08-21 DIAGNOSIS — S02.2XXA CLOSED FRACTURE OF NASAL BONE, INITIAL ENCOUNTER: Primary | ICD-10-CM

## 2023-08-21 DIAGNOSIS — S01.511A LIP LACERATION, INITIAL ENCOUNTER: ICD-10-CM
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
## 2023-08-21 LAB
EXT PREGNANCY TEST URINE: NEGATIVE
EXT. CONTROL: NORMAL

## 2023-08-21 PROCEDURE — 99284 EMERGENCY DEPT VISIT MOD MDM: CPT

## 2023-08-21 PROCEDURE — 81025 URINE PREGNANCY TEST: CPT | Performed by: EMERGENCY MEDICINE

## 2023-08-21 PROCEDURE — 90715 TDAP VACCINE 7 YRS/> IM: CPT | Performed by: EMERGENCY MEDICINE

## 2023-08-21 PROCEDURE — 70486 CT MAXILLOFACIAL W/O DYE: CPT

## 2023-08-21 PROCEDURE — 99284 EMERGENCY DEPT VISIT MOD MDM: CPT | Performed by: EMERGENCY MEDICINE

## 2023-08-21 PROCEDURE — 90471 IMMUNIZATION ADMIN: CPT

## 2023-08-21 PROCEDURE — G1004 CDSM NDSC: HCPCS

## 2023-08-21 RX ORDER — IBUPROFEN 400 MG/1
400 TABLET ORAL ONCE
Status: COMPLETED | OUTPATIENT
Start: 2023-08-21 | End: 2023-08-21

## 2023-08-21 RX ORDER — ACETAMINOPHEN 325 MG/1
975 TABLET ORAL ONCE
Status: COMPLETED | OUTPATIENT
Start: 2023-08-21 | End: 2023-08-21

## 2023-08-21 RX ADMIN — ACETAMINOPHEN 975 MG: 325 TABLET, FILM COATED ORAL at 09:48

## 2023-08-21 RX ADMIN — TETANUS TOXOID, REDUCED DIPHTHERIA TOXOID AND ACELLULAR PERTUSSIS VACCINE, ADSORBED 0.5 ML: 5; 2.5; 8; 8; 2.5 SUSPENSION INTRAMUSCULAR at 09:48

## 2023-08-21 RX ADMIN — IBUPROFEN 400 MG: 400 TABLET, FILM COATED ORAL at 09:48

## 2023-08-21 NOTE — ED PROVIDER NOTES
History  Chief Complaint   Patient presents with   • Facial Injury     Pt states she tripped over her dog and hit her nose      78-year-old female with no pertinent past medical history who presents for evaluation after facial trauma. Patient reports that she was walking on the steps when her dog accidentally tripped her. She fell forward down the stairs and struck her face on the wall at the bottom of the steps. She did not lose consciousness. She notes epistaxis that has subsequently resolved. She also sustained a cut to her lip. She is concerned about a broken nose. She has pain along her nasal bridge and face. She denies any vision changes. She denies any other injury. She denies any jaw malalignment. She did not take any medications prior to arrival for her symptoms. Prior to Admission Medications   Prescriptions Last Dose Informant Patient Reported? Taking? cyclobenzaprine (FLEXERIL) 10 mg tablet Not Taking  No No   Sig: Take 1 tablet (10 mg total) by mouth 2 (two) times a day as needed for muscle spasms   Patient not taking: Reported on 2023   naproxen (NAPROSYN) 500 mg tablet   No No   Sig: Take 1 tablet (500 mg total) by mouth 2 (two) times a day as needed for mild pain for up to 15 doses   phenazopyridine (PYRIDIUM) 200 mg tablet   No No   Sig: Take 1 tablet (200 mg total) by mouth 3 (three) times a day   Patient not taking: Reported on 2/3/2020      Facility-Administered Medications: None       Past Medical History:   Diagnosis Date   • Kidney stones        Past Surgical History:   Procedure Laterality Date   •  SECTION      x 1   • TONSILLECTOMY         Family History   Problem Relation Age of Onset   • Diabetes Mother    • Hypothyroidism Mother      I have reviewed and agree with the history as documented.     E-Cigarette/Vaping   • E-Cigarette Use Never User      E-Cigarette/Vaping Substances     Social History     Tobacco Use   • Smoking status: Some Days Packs/day: 1.00     Types: Cigarettes   • Smokeless tobacco: Never   Vaping Use   • Vaping Use: Never used   Substance Use Topics   • Alcohol use: No     Comment: Occ   • Drug use: No       Review of Systems   Constitutional: Negative for fever. HENT: Positive for facial swelling and nosebleeds. Negative for dental problem. Respiratory: Negative for shortness of breath. Cardiovascular: Negative for chest pain. Gastrointestinal: Negative for abdominal pain. Musculoskeletal: Negative for back pain, gait problem and neck pain. Skin: Positive for wound. Neurological: Negative for headaches. All other systems reviewed and are negative. Physical Exam  Physical Exam  Vitals and nursing note reviewed. Constitutional:       General: She is not in acute distress. Appearance: She is well-developed. She is not ill-appearing. HENT:      Head: Normocephalic and atraumatic. Nose:      Comments: Swelling and ecchymosis noted over the bridge of the nose. There is significant tenderness to this area. No septal hematoma. 1 cm superficial abrasion noted to the bridge of the nose. Mild tenderness throughout the bilateral zygomatic arches. No other facial tenderness. Mouth/Throat:      Mouth: Mucous membranes are moist.      Comments: 0.5 cm superficial laceration to the right upper lip, this does not cross the vermilion border. Small avulsion noted to the mucosal surface of the right upper lip. Dentition normal.  Normal jaw alignment. Eyes:      Extraocular Movements: Extraocular movements intact. Pupils: Pupils are equal, round, and reactive to light. Cardiovascular:      Rate and Rhythm: Normal rate. Pulmonary:      Effort: Pulmonary effort is normal.   Abdominal:      General: There is no distension. Musculoskeletal:         General: Normal range of motion. Cervical back: Normal range of motion and neck supple. Skin:     General: Skin is warm and dry.       Coloration: Skin is not pale. Findings: No rash. Neurological:      General: No focal deficit present. Mental Status: She is alert and oriented to person, place, and time. Cranial Nerves: No cranial nerve deficit. Sensory: No sensory deficit. Motor: No weakness. Psychiatric:         Behavior: Behavior normal.         Vital Signs  ED Triage Vitals [08/21/23 0918]   Temperature Pulse Respirations Blood Pressure SpO2   98.1 °F (36.7 °C) 84 16 108/63 100 %      Temp Source Heart Rate Source Patient Position - Orthostatic VS BP Location FiO2 (%)   Axillary -- Sitting Right arm --      Pain Score       10 - Worst Possible Pain           Vitals:    08/21/23 0918   BP: 108/63   Pulse: 84   Patient Position - Orthostatic VS: Sitting         Visual Acuity      ED Medications  Medications   acetaminophen (TYLENOL) tablet 975 mg (975 mg Oral Given 8/21/23 0948)   ibuprofen (MOTRIN) tablet 400 mg (400 mg Oral Given 8/21/23 0948)   tetanus-diphtheria-acellular pertussis (BOOSTRIX) IM injection 0.5 mL (0.5 mL Intramuscular Given 8/21/23 0948)       Diagnostic Studies  Results Reviewed     Procedure Component Value Units Date/Time    POCT pregnancy, urine [[id number]]  (Normal) Resulted: 08/21/23 0955    Lab Status: Final result Updated: 08/21/23 0955     EXT Preg Test, Ur Negative     Control Valid                 CT facial bones without contrast   Final Result by Adore Alas MD (08/21 1037)      1. Minimally displaced fracture of the left anterior nasal bone with possible minimally displaced fracture of the right nasal bone though correlation with right-sided nasal tenderness recommended. 2.  Mild to moderate paranasal sinus mucosal thickening, particularly in the left frontal sinus, with opacification of the left mastoid air cells. The study was marked in [facility] for immediate notification.                Workstation performed: [id number]                    Procedures  Procedures         ED Course  ED Course as of 08/21/23 1455   Mon Aug 21, 2023   2249 PREGNANCY TEST URINE: Negative   1040 CT facial bones without contrast  Minimally displaced fracture of the left anterior nasal bone with possible minimally displaced fracture of the right nasal bone though correlation with right-sided nasal tenderness recommended. SBIRT 22yo+    Flowsheet Row Most Recent Value   Initial Alcohol Screen: US AUDIT-C     1. How often do you have a drink containing alcohol? 0 Filed at: 08/21/2023 0922   2. How many drinks containing alcohol do you have on a typical day you are drinking? 0 Filed at: 08/21/2023 0922   3a. Male UNDER 65: How often do you have five or more drinks on one occasion? 0 Filed at: 08/21/2023 0922   3b. FEMALE Any Age, or MALE 65+: How often do you have 4 or more drinks on one occassion? 0 Filed at: 08/21/2023 5152   Audit-C Score 0 Filed at: 08/21/2023 3365   PERM: How many times in the past year have you. .. Used an illegal drug or used a prescription medication for non-medical reasons? Never Filed at: 08/21/2023 3782                    Medical Decision Making  25-year-old female presenting for evaluation after a facial injury. Patient without any concerning signs or symptoms regarding head strike to suggest intracranial hemorrhage, concussion. Patient with small lacerations to the nose and upper lip, none of which require repair. Patient was updated on tetanus. CT facial bones was obtained to evaluate for fracture, notable for a nasal bone fracture. Patient was referred to ENT for follow-up. No septal hematoma requiring intervention. Patient otherwise stable for discharge at this time. Advised follow-up with primary care physician. Return precautions discussed.     Closed fracture of nasal bone, initial encounter: acute illness or injury  Fall, initial encounter: acute illness or injury  Lip laceration, initial encounter: acute illness or injury  Amount and/or Complexity of Data Reviewed  Labs: ordered. Decision-making details documented in ED Course. Radiology: ordered. Decision-making details documented in ED Course. Risk  OTC drugs. Prescription drug management. Disposition  Final diagnoses:   Closed fracture of nasal bone, initial encounter   Lip laceration, initial encounter   Fall, initial encounter     Time reflects when diagnosis was documented in both MDM as applicable and the Disposition within this note     Time User Action Codes Description Comment    8/21/2023 10:42 AM Melvenia Favor Add [S02. 2XXA] Closed fracture of nasal bone, initial encounter     8/21/2023 10:43 AM Melvenia Favor Add [O15.418J] Lip laceration, initial encounter     8/21/2023 10:43 AM Melvenia Favor Add [F92. Kristen Primes, initial encounter       ED Disposition     ED Disposition   Discharge    Condition   Stable    Date/Time   Mon Aug 21, 2023 10:42 AM    Comment   Kyle Nortonper discharge to home/self care. Follow-up Information     Follow up With Specialties Details Why Kenisha Gupta MD Otolaryngology Schedule an appointment as soon as possible for a visit   [address] [place] [address].   [address] [place] [address]  [phone]            Discharge Medication List as of 8/21/2023 10:44 AM      CONTINUE these medications which have NOT CHANGED    Details   cyclobenzaprine (FLEXERIL) 10 mg tablet Take 1 tablet (10 mg total) by mouth 2 (two) times a day as needed for muscle spasms, Starting Wed 5/10/2023, Normal      naproxen (NAPROSYN) 500 mg tablet Take 1 tablet (500 mg total) by mouth 2 (two) times a day as needed for mild pain for up to 15 doses, Starting Thu 7/22/2021, Normal      phenazopyridine (PYRIDIUM) 200 mg tablet Take 1 tablet (200 mg total) by mouth 3 (three) times a day, Starting Sat 9/15/2018, Normal                 PDMP Review     None          ED Provider  Electronically Signed by           [facility] MD  08/21/23 1453

## 2023-08-21 NOTE — DISCHARGE INSTRUCTIONS
Keep your wounds clean and dry. You can apply ice to your nose and take tylenol and motrin every 6 hours as needed for pain. Please return to the emergency department if you develop worsening symptoms, severe pain, uncontrolled bleeding, or anything else concerning to you.

## 2023-10-06 ENCOUNTER — HOSPITAL ENCOUNTER (EMERGENCY)
Facility: HOSPITAL | Age: 28
Discharge: HOME | End: 2023-10-06
Attending: EMERGENCY MEDICINE
Payer: COMMERCIAL

## 2023-10-06 VITALS
DIASTOLIC BLOOD PRESSURE: 76 MMHG | TEMPERATURE: 98.3 F | OXYGEN SATURATION: 100 % | SYSTOLIC BLOOD PRESSURE: 120 MMHG | HEART RATE: 81 BPM | RESPIRATION RATE: 16 BRPM

## 2023-10-06 DIAGNOSIS — M54.9 UPPER BACK PAIN ON LEFT SIDE: Primary | ICD-10-CM

## 2023-10-06 PROCEDURE — 99284 EMERGENCY DEPT VISIT MOD MDM: CPT | Performed by: EMERGENCY MEDICINE

## 2023-10-06 PROCEDURE — 96372 THER/PROPH/DIAG INJ SC/IM: CPT

## 2023-10-06 PROCEDURE — 99282 EMERGENCY DEPT VISIT SF MDM: CPT

## 2023-10-06 RX ORDER — METHOCARBAMOL 500 MG/1
750 TABLET, FILM COATED ORAL ONCE
Status: COMPLETED | OUTPATIENT
Start: 2023-10-06 | End: 2023-10-06

## 2023-10-06 RX ORDER — LIDOCAINE 50 MG/G
1 PATCH TOPICAL ONCE
Status: DISCONTINUED | OUTPATIENT
Start: 2023-10-06 | End: 2023-10-06 | Stop reason: HOSPADM

## 2023-10-06 RX ORDER — NAPROXEN 500 MG/1
500 TABLET ORAL EVERY 12 HOURS PRN
Qty: 10 TABLET | Refills: 0 | Status: SHIPPED | OUTPATIENT
Start: 2023-10-06 | End: 2023-10-16

## 2023-10-06 RX ORDER — METHOCARBAMOL 500 MG/1
500 TABLET, FILM COATED ORAL EVERY 8 HOURS PRN
Qty: 5 TABLET | Refills: 0 | Status: SHIPPED | OUTPATIENT
Start: 2023-10-06 | End: 2023-10-13

## 2023-10-06 RX ORDER — KETOROLAC TROMETHAMINE 30 MG/ML
30 INJECTION, SOLUTION INTRAMUSCULAR; INTRAVENOUS ONCE
Status: COMPLETED | OUTPATIENT
Start: 2023-10-06 | End: 2023-10-06

## 2023-10-06 RX ADMIN — METHOCARBAMOL 750 MG: 500 TABLET ORAL at 04:31

## 2023-10-06 RX ADMIN — KETOROLAC TROMETHAMINE 30 MG: 30 INJECTION, SOLUTION INTRAMUSCULAR; INTRAVENOUS at 04:31

## 2023-10-06 RX ADMIN — LIDOCAINE 1 PATCH: 50 PATCH TOPICAL at 04:31

## 2023-10-06 NOTE — ED PROVIDER NOTES
History  Chief Complaint   Patient presents with   • Shoulder Pain     Pt presents to ED from home w/ left shoulder pain for months, worsening tonight. No known injury. Patient is a 25-year-old female seen in the emergency department with concern for left upper back pain over the past several weeks. Patient notes no trauma or recent heavy lifting. Patient states that she has taken Flexeril in the past, without significant proved of symptoms noted. Patient notes "burning" pain. Patient notes no radiation of the pain. Patient notes that pain is improved with motion of her left upper back. Patient notes no chest pain, shortness of breath, fever, abdominal pain, nausea, vomiting, weakness, numbness, tingling. Patient states that she took Tylenol this evening, without improvement of symptoms noted. Patient notes no other definite clear exacerbating or alleviating factors for her symptoms. Prior to Admission Medications   Prescriptions Last Dose Informant Patient Reported? Taking?   naproxen (NAPROSYN) 500 mg tablet   No No   Sig: Take 1 tablet (500 mg total) by mouth 2 (two) times a day as needed for mild pain for up to 15 doses   phenazopyridine (PYRIDIUM) 200 mg tablet   No No   Sig: Take 1 tablet (200 mg total) by mouth 3 (three) times a day   Patient not taking: Reported on 2/3/2020      Facility-Administered Medications: None       Past Medical History:   Diagnosis Date   • Kidney stones        Past Surgical History:   Procedure Laterality Date   •  SECTION      x 1   • TONSILLECTOMY         Family History   Problem Relation Age of Onset   • Diabetes Mother    • Hypothyroidism Mother      I have reviewed and agree with the history as documented.     E-Cigarette/Vaping   • E-Cigarette Use Never User      E-Cigarette/Vaping Substances     Social History     Tobacco Use   • Smoking status: Some Days     Packs/day: 1.00     Types: Cigarettes   • Smokeless tobacco: Never   Vaping Use   • Vaping Use: Never used   Substance Use Topics   • Alcohol use: No     Comment: Occ   • Drug use: No       Review of Systems   Constitutional: Negative for chills and fever. HENT: Negative for ear pain and sore throat. Eyes: Negative for pain and visual disturbance. Respiratory: Negative for cough and shortness of breath. Cardiovascular: Negative for chest pain and palpitations. Gastrointestinal: Negative for abdominal pain and vomiting. Genitourinary: Negative for decreased urine volume and difficulty urinating. Musculoskeletal: Positive for back pain. Negative for neck stiffness. Skin: Negative for color change and rash. Neurological: Negative for weakness and numbness. Psychiatric/Behavioral: Negative for agitation and confusion. Physical Exam  Physical Exam  Vitals and nursing note reviewed. Constitutional:       General: She is not in acute distress. Appearance: She is well-developed. HENT:      Head: Normocephalic and atraumatic. Right Ear: External ear normal.      Left Ear: External ear normal.      Nose: Nose normal.      Mouth/Throat:      Pharynx: Oropharynx is clear. Eyes:      General: No scleral icterus. Conjunctiva/sclera: Conjunctivae normal.   Cardiovascular:      Rate and Rhythm: Normal rate and regular rhythm. Heart sounds: No murmur heard. Pulmonary:      Effort: Pulmonary effort is normal. No respiratory distress. Breath sounds: Normal breath sounds. Abdominal:      General: Abdomen is flat. There is no distension. Musculoskeletal:         General: Tenderness present. No swelling, deformity or signs of injury. Cervical back: Normal range of motion and neck supple. Comments: mild tenderness to palpation of left upper back with apparent trapezius spasm noted   Skin:     General: Skin is warm and dry. Neurological:      General: No focal deficit present. Mental Status: She is alert.       Cranial Nerves: No cranial nerve deficit. Sensory: No sensory deficit. Psychiatric:         Mood and Affect: Mood normal.         Thought Content: Thought content normal.         Vital Signs  ED Triage Vitals   Temperature Pulse Respirations Blood Pressure SpO2   10/06/23 0408 10/06/23 0408 10/06/23 0408 10/06/23 0408 10/06/23 0408   98.3 °F (36.8 °C) 81 16 120/76 100 %      Temp Source Heart Rate Source Patient Position - Orthostatic VS BP Location FiO2 (%)   10/06/23 0408 -- -- -- --   Oral          Pain Score       10/06/23 0431       7           Vitals:    10/06/23 0408   BP: 120/76   Pulse: 81         Visual Acuity      ED Medications  Medications   lidocaine (LIDODERM) 5 % patch 1 patch (1 patch Topical Medication Applied 10/6/23 0431)   ketorolac (TORADOL) injection 30 mg (30 mg Intramuscular Given 10/6/23 0431)   methocarbamol (ROBAXIN) tablet 750 mg (750 mg Oral Given 10/6/23 0431)       Diagnostic Studies  Results Reviewed     None                 No orders to display              Procedures  Procedures         ED Course                               SBIRT 22yo+    Flowsheet Row Most Recent Value   Initial Alcohol Screen: US AUDIT-C     1. How often do you have a drink containing alcohol? 0 Filed at: 10/06/2023 0441   2. How many drinks containing alcohol do you have on a typical day you are drinking? 0 Filed at: 10/06/2023 0441   3b. FEMALE Any Age, or MALE 65+: How often do you have 4 or more drinks on one occassion? 0 Filed at: 10/06/2023 0441   Audit-C Score 0 Filed at: 10/06/2023 0441   PREM: How many times in the past year have you. .. Used an illegal drug or used a prescription medication for non-medical reasons? Never Filed at: 10/06/2023 [facility]                    Medical Decision Making  Patient is a 78-year-old female seen in the emergency department with concern for left upper back pain, apparent trapezius spasm. Evaluation is not consistent with acute fracture. X-ray imaging is not indicated at this time.  Patient was treated with medication for symptom control. Plan to treat patient with course of NSAID medication in addition to muscle relaxant, and have patient follow up with PCP/outpatient providers. Patient stable for discharge home. Discharge instructions were reviewed with patient. Risk  Prescription drug management. Disposition  Final diagnoses:   Upper back pain on left side     Time reflects when diagnosis was documented in both MDM as applicable and the Disposition within this note     Time User Action Codes Description Comment    10/6/2023  4:21 AM Arthur Mateo Add [M54.9] Upper back pain on left side       ED Disposition     ED Disposition   Discharge    Condition   Stable    Date/Time   Fri Oct 6, 2023  4:21 AM    Comment   Lucretia Lux discharge to home/self care.                Follow-up Information     Follow up With Specialties Details Why Contact Info Additional Information    Your primary doctor  Call in 1 day       [address] Call  As needed [address] [facility] [id number]  [facility] [address] [place], [place], [address]    [address] [facility] Orthopedic Surgery Call  As needed [address] [address] [address] [place] [address] [address] [facility] [address] [facility]), [address] [facility] [phone]    [facility] SPECIALTY [facility] Physical Therapy Call  As needed           Discharge Medication List as of 10/6/2023  4:25 AM      START taking these medications    Details   methocarbamol (ROBAXIN) 500 mg tablet Take 1 tablet (500 mg total) by mouth every 8 (eight) hours as needed for muscle spasms for up to 7 days, Starting Fri 10/6/2023, Until Fri 10/13/2023 at 2359, Normal         CONTINUE these medications which have CHANGED    Details   naproxen (Naprosyn) 500 mg tablet Take 1 tablet (500 mg total) by mouth every 12 (twelve) hours as needed (pain) for up to 10 days Take with food., Starting Fri 10/6/2023, Until Mon 10/16/2023 at 2359, Normal         CONTINUE these medications which have NOT CHANGED    Details   phenazopyridine (PYRIDIUM) 200 mg tablet Take 1 tablet (200 mg total) by mouth 3 (three) times a day, Starting Sat 9/15/2018, Normal                 PDMP Review     None          ED Provider  Electronically Signed by           Giorgi Snyder MD  10/06/23 9448

## 2023-10-09 ENCOUNTER — TELEPHONE (OUTPATIENT)
Dept: PHYSICAL THERAPY | Facility: OTHER | Age: 28
End: 2023-10-09

## 2023-10-09 NOTE — TELEPHONE ENCOUNTER
Call placed to the patient per [facility] referral.    V/M left for patient to call back. Phone number and hours of business provided. This is the 1st attempt to reach the patient. Will defer referral per protocol.

## 2023-10-12 NOTE — TELEPHONE ENCOUNTER
Call placed to the patient per [facility] referral.     V/M left for patient to call back. Phone number and hours of business provided. This is the 2nd attempt to reach the patient. Will defer referral per protocol.

## 2023-10-19 NOTE — TELEPHONE ENCOUNTER
Call placed to the patient per [facility] referral.    Voice message left for patient to call back. Phone number and hours of business provided. This is the 3rd attempt to reach the patient. Referral closed.

## 2023-11-04 ENCOUNTER — HOSPITAL ENCOUNTER (EMERGENCY)
Facility: HOSPITAL | Age: 28
Discharge: HOME | End: 2023-11-04
Attending: EMERGENCY MEDICINE | Admitting: EMERGENCY MEDICINE
Payer: COMMERCIAL

## 2023-11-04 VITALS
BODY MASS INDEX: 26 KG/M2 | SYSTOLIC BLOOD PRESSURE: 102 MMHG | OXYGEN SATURATION: 97 % | DIASTOLIC BLOOD PRESSURE: 61 MMHG | WEIGHT: 137.6 LBS | RESPIRATION RATE: 20 BRPM | TEMPERATURE: 98.4 F | HEART RATE: 86 BPM

## 2023-11-04 DIAGNOSIS — J06.9 VIRAL URI WITH COUGH: Primary | ICD-10-CM

## 2023-11-04 LAB
FLUAV RNA RESP QL NAA+PROBE: NEGATIVE
FLUBV RNA RESP QL NAA+PROBE: NEGATIVE
RSV RNA RESP QL NAA+PROBE: NEGATIVE
SARS-COV-2 RNA RESP QL NAA+PROBE: NEGATIVE

## 2023-11-04 PROCEDURE — 99283 EMERGENCY DEPT VISIT LOW MDM: CPT

## 2023-11-04 PROCEDURE — 0241U HB NFCT DS VIR RESP RNA 4 TRGT: CPT | Performed by: EMERGENCY MEDICINE

## 2023-11-04 PROCEDURE — 99284 EMERGENCY DEPT VISIT MOD MDM: CPT | Performed by: EMERGENCY MEDICINE

## 2023-11-04 RX ORDER — ACETAMINOPHEN 500 MG
1000 TABLET ORAL EVERY 8 HOURS PRN
Qty: 60 TABLET | Refills: 0 | Status: SHIPPED | OUTPATIENT
Start: 2023-11-04

## 2023-11-04 RX ORDER — FLUTICASONE PROPIONATE 50 MCG
1 SPRAY, SUSPENSION (ML) NASAL DAILY
Qty: 16 G | Refills: 0 | Status: SHIPPED | OUTPATIENT
Start: 2023-11-04

## 2023-11-04 RX ORDER — IBUPROFEN 600 MG/1
600 TABLET ORAL EVERY 6 HOURS PRN
Qty: 30 TABLET | Refills: 0 | Status: SHIPPED | OUTPATIENT
Start: 2023-11-04

## 2023-11-05 NOTE — DISCHARGE INSTRUCTIONS
Additionally, you can use over-the-counter treatments for you symptoms, such as cough lozenges and/or warm tea with honey.

## 2023-11-05 NOTE — ED PROVIDER NOTES
History  Chief Complaint   Patient presents with    Cold Like Symptoms     Pt with cough x 1 week, woke up with a headache, has runny nose and post nasal drip      26 y/o female presents to the ED for evaluation of viral URI symptoms with cough. The patient states that she has been experiencing a nonproductive cough with associated sinus congestion, rhinorrhea, and sore throat over the last week. She states that this morning she woke up with a mild generalized headache. The patient works night shift and she denies any recent sick contacts. She states that she took NyQuil at the end of her shift this morning before going to bed and she took a dose of Tylenol approximately 3 hours ago with mild improvement of her symptoms. She states that her cough and sinus congestion have been the most bothersome symptoms and she came to the ER for evaluation for any possible medications that may help with the symptoms. She denies any documented fevers, chills, wheezing, dyspnea, chest pain, abdominal pain, nausea, vomiting, diarrhea, urinary symptoms, back pain, neck pain, numbness, or weakness. Prior to Admission Medications   Prescriptions Last Dose Informant Patient Reported? Taking? methocarbamol (ROBAXIN) 500 mg tablet   No No   Sig: Take 1 tablet (500 mg total) by mouth every 8 (eight) hours as needed for muscle spasms for up to 7 days   naproxen (Naprosyn) 500 mg tablet   No No   Sig: Take 1 tablet (500 mg total) by mouth every 12 (twelve) hours as needed (pain) for up to 10 days Take with food.    phenazopyridine (PYRIDIUM) 200 mg tablet   No No   Sig: Take 1 tablet (200 mg total) by mouth 3 (three) times a day   Patient not taking: Reported on 2/3/2020      Facility-Administered Medications: None       Past Medical History:   Diagnosis Date    Kidney stones        Past Surgical History:   Procedure Laterality Date     SECTION      x 1    TONSILLECTOMY         Family History   Problem Relation Age of Onset    Diabetes Mother     Hypothyroidism Mother      I have reviewed and agree with the history as documented. E-Cigarette/Vaping    E-Cigarette Use Never User      E-Cigarette/Vaping Substances     Social History     Tobacco Use    Smoking status: Some Days     Packs/day: 1.00     Types: Cigarettes    Smokeless tobacco: Never   Vaping Use    Vaping Use: Never used   Substance Use Topics    Alcohol use: No     Comment: Occ    Drug use: No       Review of Systems   Constitutional:  Negative for chills and fever. HENT:  Positive for congestion, rhinorrhea and sore throat. Respiratory:  Positive for cough. Negative for shortness of breath. Cardiovascular:  Negative for chest pain and palpitations. Gastrointestinal:  Negative for abdominal pain, diarrhea, nausea and vomiting. Genitourinary:  Negative for dysuria and hematuria. Musculoskeletal:  Negative for back pain and neck pain. Neurological:  Negative for dizziness, weakness, light-headedness, numbness and headaches. All other systems reviewed and are negative. Physical Exam  Physical Exam  Vitals and nursing note reviewed. Constitutional:       General: She is not in acute distress. Appearance: Normal appearance. She is normal weight. She is not ill-appearing. HENT:      Head: Normocephalic and atraumatic. Right Ear: External ear normal.      Left Ear: External ear normal.      Nose: Congestion and rhinorrhea present. Mouth/Throat:      Mouth: Mucous membranes are moist.      Pharynx: Oropharynx is clear. No oropharyngeal exudate or posterior oropharyngeal erythema. Eyes:      Extraocular Movements: Extraocular movements intact. Conjunctiva/sclera: Conjunctivae normal.      Pupils: Pupils are equal, round, and reactive to light. Cardiovascular:      Rate and Rhythm: Normal rate and regular rhythm. Pulses: Normal pulses. Heart sounds: Normal heart sounds. No murmur heard.   Pulmonary:      Effort: Pulmonary effort is normal. No respiratory distress. Breath sounds: Normal breath sounds. No wheezing or rales. Abdominal:      General: Abdomen is flat. Bowel sounds are normal. There is no distension. Palpations: Abdomen is soft. Tenderness: There is no abdominal tenderness. There is no right CVA tenderness, left CVA tenderness or guarding. Musculoskeletal:         General: No swelling or tenderness. Normal range of motion. Cervical back: Normal range of motion and neck supple. No tenderness. Skin:     General: Skin is warm and dry. Capillary Refill: Capillary refill takes less than 2 seconds. Neurological:      General: No focal deficit present. Mental Status: She is alert and oriented to person, place, and time. Vital Signs  ED Triage Vitals [11/04/23 2036]   Temperature Pulse Respirations Blood Pressure SpO2   98.4 °F (36.9 °C) 86 20 102/61 97 %      Temp Source Heart Rate Source Patient Position - Orthostatic VS BP Location FiO2 (%)   Oral -- Sitting Right arm --      Pain Score       --           Vitals:    11/04/23 2036   BP: 102/61   Pulse: 86   Patient Position - Orthostatic VS: Sitting         Visual Acuity      ED Medications  Medications - No data to display    Diagnostic Studies  Results Reviewed       Procedure Component Value Units Date/Time    FLU/RSV/COVID - if FLU/RSV clinically relevant [[id number]] Collected: 11/04/23 2049    Lab Status: In process Specimen: Nares from Nose Updated: 11/04/23 2051                   No orders to display              Procedures  Procedures         ED Course                               SBIRT 20yo+      Flowsheet Row Most Recent Value   Initial Alcohol Screen: US AUDIT-C     1. How often do you have a drink containing alcohol? 0 Filed at: 11/04/2023 2039   2. How many drinks containing alcohol do you have on a typical day you are drinking? 0 Filed at: 11/04/2023 2039   3a. Male UNDER 65:  How often do you have five or more drinks on one occasion? 0 Filed at: 11/04/2023 2039   3b. FEMALE Any Age, or MALE 65+: How often do you have 4 or more drinks on one occassion? 0 Filed at: 11/04/2023 2039   Audit-C Score 0 Filed at: 11/04/2023 2039   PREM: How many times in the past year have you. .. Used an illegal drug or used a prescription medication for non-medical reasons? Never Filed at: 11/04/2023 2039                      Medical Decision Making  24 y/o female presents to the ED for evaluation of viral URI symptoms with cough. The patient states that she has been experiencing a nonproductive cough with associated sinus congestion, rhinorrhea, and sore throat over the last week. She states that this morning she woke up with a mild generalized headache. The patient works night shift and she denies any recent sick contacts. She states that she took NyQuil at the end of her shift this morning before going to bed and she took a dose of Tylenol approximately 3 hours ago with mild improvement of her symptoms. She states that her cough and sinus congestion have been the most bothersome symptoms and she came to the ER for evaluation for any possible medications that may help with the symptoms. She denies any documented fevers, chills, wheezing, dyspnea, chest pain, abdominal pain, nausea, vomiting, diarrhea, urinary symptoms, back pain, neck pain, numbness, or weakness. Vital signs reviewed. See physical exam documentation for exam findings. Differential diagnosis includes but is not limited to COVID-19, influenza, and/or other viral URI. No pharyngeal erythema or exudates to suggest bacterial pharyngitis. I discussed with the patient and recommended symptomatic management including over-the-counter cough lozenges, warm tea with honey, Flonase for sinus congestion, and scheduled Tylenol and Motrin. I also instructed the patient that if she takes NyQuil she should counted as one of her Tylenol doses for the day.  We will send viral testing for COVID/influenza/RSV. Test results were pending at time of discharge and patient states that she has access to [facility] to follow-up on her results. I discussed all findings, treatment, red flags/return precautions, and outpatient follow-up and the patient/family understand and agree. Stable for discharge. Disposition  Final diagnoses:   Viral URI with cough     Time reflects when diagnosis was documented in both MDM as applicable and the Disposition within this note       Time User Action Codes Description Comment    11/4/2023  8:44 PM Elisa Tinajero Add [J06.9] Viral URI with cough           ED Disposition       ED Disposition   Discharge    Condition   Stable    Date/Time   Sat Nov 4, 2023 2044    Comment   Khloe Clyde discharge to home/self care.                    Follow-up Information       Follow up With Specialties Details Why Contact Info Additional [address] [facility] Call in 1 week For follow-up, As needed [address] [facility] [id number]  [address], [address] [facility] Emergency Medicine Go to  If symptoms worsen [address] [place] [id number] 67[address] [facility], [address] [place] [address] Dr, [address] [facility]            Discharge Medication List as of 11/4/2023  8:46 PM        START taking these medications    Details   acetaminophen (TYLENOL) 500 mg tablet Take 2 tablets (1,000 mg total) by mouth every 8 (eight) hours as needed for mild pain, Starting Sat 11/4/2023, Normal      fluticasone (FLONASE) 50 mcg/act nasal spray 1 spray into each nostril daily, Starting Sat 11/4/2023, Normal      ibuprofen (MOTRIN) 600 mg tablet Take 1 tablet (600 mg total) by mouth every 6 (six) hours as needed for moderate pain, Starting Sat 11/4/2023, Normal           CONTINUE these medications which have NOT CHANGED    Details   methocarbamol (ROBAXIN) 500 mg tablet Take 1 tablet (500 mg total) by mouth every 8 (eight) hours as needed for muscle spasms for up to 7 days, Starting Fri 10/6/2023, Until Fri 10/13/2023 at 2359, Normal      naproxen (Naprosyn) 500 mg tablet Take 1 tablet (500 mg total) by mouth every 12 (twelve) hours as needed (pain) for up to 10 days Take with food., Starting Fri 10/6/2023, Until Mon 10/16/2023 at 2359, Normal      phenazopyridine (PYRIDIUM) 200 mg tablet Take 1 tablet (200 mg total) by mouth 3 (three) times a day, Starting Sat 9/15/2018, Normal             No discharge procedures on file.     PDMP Review       None            ED Provider  Electronically Signed by             Cori Tripathi MD  11/04/23 6111

## 2024-11-09 ENCOUNTER — APPOINTMENT (EMERGENCY)
Dept: CT IMAGING | Facility: HOSPITAL | Age: 29
End: 2024-11-09
Payer: COMMERCIAL

## 2024-11-09 ENCOUNTER — HOSPITAL ENCOUNTER (EMERGENCY)
Facility: HOSPITAL | Age: 29
Discharge: HOME | End: 2024-11-09
Attending: EMERGENCY MEDICINE | Admitting: EMERGENCY MEDICINE
Payer: COMMERCIAL

## 2024-11-09 ENCOUNTER — APPOINTMENT (EMERGENCY)
Dept: RADIOLOGY | Facility: HOSPITAL | Age: 29
End: 2024-11-09
Payer: COMMERCIAL

## 2024-11-09 VITALS
OXYGEN SATURATION: 98 % | DIASTOLIC BLOOD PRESSURE: 65 MMHG | TEMPERATURE: 98.1 F | RESPIRATION RATE: 18 BRPM | HEART RATE: 62 BPM | SYSTOLIC BLOOD PRESSURE: 92 MMHG

## 2024-11-09 DIAGNOSIS — N39.0 UTI (URINARY TRACT INFECTION): ICD-10-CM

## 2024-11-09 DIAGNOSIS — R10.12 LEFT UPPER QUADRANT ABDOMINAL PAIN: Primary | ICD-10-CM

## 2024-11-09 LAB
ALBUMIN SERPL BCG-MCNC: 4.4 G/DL (ref 3.5–5)
ALP SERPL-CCNC: 50 U/L (ref 34–104)
ALT SERPL W P-5'-P-CCNC: 27 U/L (ref 7–52)
ANION GAP SERPL CALCULATED.3IONS-SCNC: 5 MMOL/L (ref 4–13)
AST SERPL W P-5'-P-CCNC: 20 U/L (ref 13–39)
BACTERIA UR QL AUTO: ABNORMAL /HPF
BASOPHILS # BLD AUTO: 0.09 THOUSANDS/ÂΜL (ref 0–0.1)
BASOPHILS NFR BLD AUTO: 1 % (ref 0–1)
BILIRUB SERPL-MCNC: 0.62 MG/DL (ref 0.2–1)
BILIRUB UR QL STRIP: NEGATIVE
BUN SERPL-MCNC: 13 MG/DL (ref 5–25)
CALCIUM SERPL-MCNC: 9.1 MG/DL (ref 8.4–10.2)
CHLORIDE SERPL-SCNC: 105 MMOL/L (ref 96–108)
CLARITY UR: ABNORMAL
CO2 SERPL-SCNC: 28 MMOL/L (ref 21–32)
COLOR UR: YELLOW
CREAT SERPL-MCNC: 0.74 MG/DL (ref 0.6–1.3)
EOSINOPHIL # BLD AUTO: 1.04 THOUSAND/ÂΜL (ref 0–0.61)
EOSINOPHIL NFR BLD AUTO: 11 % (ref 0–6)
ERYTHROCYTE [DISTWIDTH] IN BLOOD BY AUTOMATED COUNT: 12.4 % (ref 11.6–15.1)
EXT PREGNANCY TEST URINE: NEGATIVE
EXT. CONTROL: NORMAL
GFR SERPL CREATININE-BSD FRML MDRD: 108 ML/MIN/1.73SQ M
GLUCOSE SERPL-MCNC: 97 MG/DL (ref 65–140)
GLUCOSE UR STRIP-MCNC: NEGATIVE MG/DL
HCT VFR BLD AUTO: 45.4 % (ref 34.8–46.1)
HGB BLD-MCNC: 15.6 G/DL (ref 11.5–15.4)
HGB UR QL STRIP.AUTO: ABNORMAL
IMM GRANULOCYTES # BLD AUTO: 0.03 THOUSAND/UL (ref 0–0.2)
IMM GRANULOCYTES NFR BLD AUTO: 0 % (ref 0–2)
KETONES UR STRIP-MCNC: NEGATIVE MG/DL
LEUKOCYTE ESTERASE UR QL STRIP: ABNORMAL
LIPASE SERPL-CCNC: 33 U/L (ref 11–82)
LYMPHOCYTES # BLD AUTO: 3.29 THOUSANDS/ÂΜL (ref 0.6–4.47)
LYMPHOCYTES NFR BLD AUTO: 34 % (ref 14–44)
MCH RBC QN AUTO: 32.4 PG (ref 26.8–34.3)
MCHC RBC AUTO-ENTMCNC: 34.4 G/DL (ref 31.4–37.4)
MCV RBC AUTO: 94 FL (ref 82–98)
MONOCYTES # BLD AUTO: 0.62 THOUSAND/ÂΜL (ref 0.17–1.22)
MONOCYTES NFR BLD AUTO: 6 % (ref 4–12)
MUCOUS THREADS UR QL AUTO: ABNORMAL
NEUTROPHILS # BLD AUTO: 4.65 THOUSANDS/ÂΜL (ref 1.85–7.62)
NEUTS SEG NFR BLD AUTO: 48 % (ref 43–75)
NITRITE UR QL STRIP: NEGATIVE
NON-SQ EPI CELLS URNS QL MICRO: ABNORMAL /HPF
NRBC BLD AUTO-RTO: 0 /100 WBCS
PH UR STRIP.AUTO: 5.5 [PH]
PLATELET # BLD AUTO: 239 THOUSANDS/UL (ref 149–390)
PMV BLD AUTO: 9.2 FL (ref 8.9–12.7)
POTASSIUM SERPL-SCNC: 3.8 MMOL/L (ref 3.5–5.3)
PROT SERPL-MCNC: 6.8 G/DL (ref 6.4–8.4)
PROT UR STRIP-MCNC: ABNORMAL MG/DL
RBC # BLD AUTO: 4.82 MILLION/UL (ref 3.81–5.12)
RBC #/AREA URNS AUTO: ABNORMAL /HPF
SODIUM SERPL-SCNC: 138 MMOL/L (ref 135–147)
SP GR UR STRIP.AUTO: 1.02 (ref 1–1.03)
UROBILINOGEN UR STRIP-ACNC: <2 MG/DL
WBC # BLD AUTO: 9.72 THOUSAND/UL (ref 4.31–10.16)
WBC #/AREA URNS AUTO: ABNORMAL /HPF

## 2024-11-09 PROCEDURE — 87086 URINE CULTURE/COLONY COUNT: CPT

## 2024-11-09 PROCEDURE — 96374 THER/PROPH/DIAG INJ IV PUSH: CPT

## 2024-11-09 PROCEDURE — 83690 ASSAY OF LIPASE: CPT

## 2024-11-09 PROCEDURE — 71045 X-RAY EXAM CHEST 1 VIEW: CPT

## 2024-11-09 PROCEDURE — 74177 CT ABD & PELVIS W/CONTRAST: CPT

## 2024-11-09 PROCEDURE — 96375 TX/PRO/DX INJ NEW DRUG ADDON: CPT

## 2024-11-09 PROCEDURE — 96361 HYDRATE IV INFUSION ADD-ON: CPT

## 2024-11-09 PROCEDURE — 81001 URINALYSIS AUTO W/SCOPE: CPT

## 2024-11-09 PROCEDURE — 99284 EMERGENCY DEPT VISIT MOD MDM: CPT

## 2024-11-09 PROCEDURE — 93005 ELECTROCARDIOGRAM TRACING: CPT

## 2024-11-09 PROCEDURE — 99285 EMERGENCY DEPT VISIT HI MDM: CPT

## 2024-11-09 PROCEDURE — 85025 COMPLETE CBC W/AUTO DIFF WBC: CPT

## 2024-11-09 PROCEDURE — 81025 URINE PREGNANCY TEST: CPT

## 2024-11-09 PROCEDURE — 80053 COMPREHEN METABOLIC PANEL: CPT

## 2024-11-09 PROCEDURE — 36415 COLL VENOUS BLD VENIPUNCTURE: CPT

## 2024-11-09 RX ORDER — KETOROLAC TROMETHAMINE 30 MG/ML
15 INJECTION, SOLUTION INTRAMUSCULAR; INTRAVENOUS ONCE
Status: COMPLETED | OUTPATIENT
Start: 2024-11-09 | End: 2024-11-09

## 2024-11-09 RX ORDER — ONDANSETRON 2 MG/ML
4 INJECTION INTRAMUSCULAR; INTRAVENOUS ONCE
Status: COMPLETED | OUTPATIENT
Start: 2024-11-09 | End: 2024-11-09

## 2024-11-09 RX ORDER — CEPHALEXIN 500 MG/1
500 CAPSULE ORAL 4 TIMES DAILY
Qty: 20 CAPSULE | Refills: 0 | Status: SHIPPED | OUTPATIENT
Start: 2024-11-09 | End: 2024-11-14

## 2024-11-09 RX ADMIN — ONDANSETRON 4 MG: 2 INJECTION INTRAMUSCULAR; INTRAVENOUS at 21:18

## 2024-11-09 RX ADMIN — IOHEXOL 85 ML: 350 INJECTION, SOLUTION INTRAVENOUS at 20:13

## 2024-11-09 RX ADMIN — KETOROLAC TROMETHAMINE 15 MG: 30 INJECTION, SOLUTION INTRAMUSCULAR; INTRAVENOUS at 21:20

## 2024-11-09 RX ADMIN — SODIUM CHLORIDE 1000 ML: 0.9 INJECTION, SOLUTION INTRAVENOUS at 21:17

## 2024-11-09 RX ADMIN — CEPHALEXIN 500 MG: 250 CAPSULE ORAL at 21:21

## 2024-11-09 NOTE — Clinical Note
Gilma Prieto was seen and treated in our emergency department on 11/9/2024.                Diagnosis:     Gilma  may return to work on return date.    She may return on this date: 11/11/2024         If you have any questions or concerns, please don't hesitate to call.      Isabel Pinzon PA-C    ______________________________           _______________          _______________  Hospital Representative                              Date                                Time

## 2024-11-10 LAB — BACTERIA UR CULT: NORMAL

## 2024-11-10 NOTE — ED PROVIDER NOTES
Time reflects when diagnosis was documented in both MDM as applicable and the Disposition within this note       Time User Action Codes Description Comment    11/9/2024 10:13 PM Isabel Pinzon Add [R10.12] Left upper quadrant abdominal pain     11/9/2024 10:14 PM Isabel Pinzon Add [N39.0] UTI (urinary tract infection)           ED Disposition       ED Disposition   Discharge    Condition   Stable    Date/Time   Sat Nov 9, 2024 10:13 PM    Comment   Gilma Olveras discharge to home/self care.                   Assessment & Plan       Medical Decision Making  Patient seen, examined and noted to have left upper quadrant abdominal pain and UTI.  Patient is coming in with LUQ pain. She states that she had an episode of this pain on Monday however it resolved on its own. Patient states the pain returned today and has not relented all day. LMP ended on 11/6/24. No other associated symptoms. Patient is very tender on my exam and guarding. Because of this labs and imaging were initiated.  Labs today revealed a negative pregnancy test, a urinary tract infection with 10-20 white blood cells and occasional bacteria cells.  UA was leukocyte positive.  Lipase of 33 and CBC and CMP were largely unremarkable.  CT of patient's abdomen and pelvis showed no acute pathologies.  Discussed this with patient.  She was given her first dose of Keflex here to treat the urinary tract infection and will complete a course of antibiotics at home.  Encouraged primary care follow-up which patient is in agreement and understanding with.  Return precautions discussed.    Differential diagnosis includes but is not limited to appendicitis, gastroenteritis, gastritis, PUD, GERD, hepatitis, pancreatitis, cholecystitis, biliary colic, choledocholithiasis, perforated viscus, tumor, splenic etiology, diverticulitis, renal colic, pyelonephritis, UTI.     Patient's labs notable for: mentioned above    Imaging revealed:   Mentioned above    EKG: was  interpreted by myself as above.     Patient appears well, is nontoxic appearing, she expresses understanding and agrees with plan of care at this time.  In light of this patient would benefit from outpatient management.    Patient was rx'd: keflex    Patient at time of discharge well-appearing in no acute distress, all questions answered. Patient agreeable to plan.  Patient's vitals, lab/imaging results, diagnosis, and treatment plan were discussed with the patient. All new/changed medications were discussed with patient, specifically, route of administration, how often and when to take, and where they can be picked up. Strict return precautions as well as close follow up with PCP was discussed with the patient and the patient was agreeable to my recommendations. Patient verbally acknowledged understanding of the above communications. Strict return precautions were provided. All labs reviewed and utilized in the medical decision making process (if labs were ordered).       Amount and/or Complexity of Data Reviewed  Labs: ordered.  Radiology: ordered and independent interpretation performed.    Risk  Prescription drug management.        ED Course as of 11/10/24 0408   Sat Nov 09, 2024 2128 CT on vrads       Medications   sodium chloride 0.9 % bolus 1,000 mL (0 mL Intravenous Stopped 11/9/24 2220)   cephalexin (KEFLEX) capsule 500 mg (500 mg Oral Given 11/9/24 2121)   iohexol (OMNIPAQUE) 350 MG/ML injection (MULTI-DOSE) 85 mL (85 mL Intravenous Given 11/9/24 2013)   ketorolac (TORADOL) injection 15 mg (15 mg Intravenous Given 11/9/24 2120)   ondansetron (ZOFRAN) injection 4 mg (4 mg Intravenous Given 11/9/24 2118)       ED Risk Strat Scores                           SBIRT 22yo+      Flowsheet Row Most Recent Value   Initial Alcohol Screen: US AUDIT-C     1. How often do you have a drink containing alcohol? 0 Filed at: 11/09/2024 1901   2. How many drinks containing alcohol do you have on a typical day you are  drinking?  0 Filed at: 2024   3a. Male UNDER 65: How often do you have five or more drinks on one occasion? 0 Filed at: 2024   3b. FEMALE Any Age, or MALE 65+: How often do you have 4 or more drinks on one occassion? 0 Filed at: 2024   Audit-C Score 0 Filed at: 2024   PREM: How many times in the past year have you...    Used an illegal drug or used a prescription medication for non-medical reasons? Never Filed at: 2024                            History of Present Illness       Chief Complaint   Patient presents with    Abdominal Pain     Patient comes in reporting sharp pain in LUQ below ribs. States pain began few days ago but subsided on its own, now persistent. Rates pain 8/10. Denies N/V/D. No medication PTA.        Past Medical History:   Diagnosis Date    Kidney stones       Past Surgical History:   Procedure Laterality Date     SECTION      x 1    TONSILLECTOMY        Family History   Problem Relation Age of Onset    Diabetes Mother     Hypothyroidism Mother       Social History     Tobacco Use    Smoking status: Some Days     Current packs/day: 1.00     Types: Cigarettes    Smokeless tobacco: Never   Vaping Use    Vaping status: Never Used   Substance Use Topics    Alcohol use: No     Comment: Occ    Drug use: No      E-Cigarette/Vaping    E-Cigarette Use Never User       E-Cigarette/Vaping Substances    Nicotine No     THC No     CBD No     Flavoring No     Other No     Unknown No       I have reviewed and agree with the history as documented.     Gilma Prieto is a 29 y.o. female with no PMH presenting to the ED on 2024 with abdominal pain. Patient endorses that she is having LUQ pain. Patient states she had an episode of this on Monday that resolved spontaneously however it returned today and has not relented. No changes in appetitive. No nausea or vomiting. LMP ended on . Last normal bowel movement this morning. Patient  denies chest pain shortness of breath, radiation of pain, diarrhea, constipation, urinary symptoms or any other complaints at this time.         Abdominal Pain  Associated symptoms: no chest pain, no chills, no constipation, no cough, no diarrhea, no dysuria, no fatigue, no fever, no hematuria, no nausea, no shortness of breath and no vomiting        Review of Systems   Constitutional:  Negative for chills, fatigue and fever.   Respiratory:  Negative for cough and shortness of breath.    Cardiovascular:  Negative for chest pain and palpitations.   Gastrointestinal:  Positive for abdominal pain. Negative for constipation, diarrhea, nausea and vomiting.   Genitourinary:  Negative for difficulty urinating, dysuria, flank pain, frequency, hematuria and urgency.   Skin:  Negative for rash.           Objective       ED Triage Vitals   Temperature Pulse Blood Pressure Respirations SpO2 Patient Position - Orthostatic VS   11/09/24 1901 11/09/24 1901 11/09/24 1902 11/09/24 1901 11/09/24 1902 --   98.1 °F (36.7 °C) 62 92/65 18 98 %       Temp Source Heart Rate Source BP Location FiO2 (%) Pain Score    11/09/24 1901 11/09/24 1901 -- -- 11/09/24 2120    Oral Monitor   8      Vitals      Date and Time Temp Pulse SpO2 Resp BP Pain Score FACES Pain Rating User   11/09/24 2120 -- -- -- -- -- 8 -- ND   11/09/24 1902 -- -- 98 % -- 92/65 -- --    11/09/24 1901 98.1 °F (36.7 °C) 62 -- 18 -- -- --             Physical Exam  Vitals and nursing note reviewed.   Constitutional:       General: She is not in acute distress.     Appearance: Normal appearance. She is well-developed and normal weight. She is not ill-appearing, toxic-appearing or diaphoretic.   HENT:      Head: Normocephalic and atraumatic.      Right Ear: External ear normal.      Left Ear: External ear normal.      Nose: Nose normal. No congestion or rhinorrhea.      Mouth/Throat:      Mouth: Mucous membranes are moist.   Eyes:      General: No scleral icterus.         Right eye: No discharge.         Left eye: No discharge.      Extraocular Movements: Extraocular movements intact.      Conjunctiva/sclera: Conjunctivae normal.   Cardiovascular:      Rate and Rhythm: Normal rate and regular rhythm.      Heart sounds: Normal heart sounds. No murmur heard.     No friction rub. No gallop.   Pulmonary:      Effort: Pulmonary effort is normal. No respiratory distress.      Breath sounds: Normal breath sounds. No wheezing, rhonchi or rales.   Abdominal:      General: Abdomen is flat. Bowel sounds are normal. There is no distension.      Palpations: Abdomen is soft.      Tenderness: There is abdominal tenderness. There is guarding. There is no right CVA tenderness, left CVA tenderness or rebound.      Hernia: No hernia is present.   Musculoskeletal:         General: No signs of injury. Normal range of motion.      Cervical back: Normal range of motion and neck supple. No rigidity.   Skin:     General: Skin is warm.      Capillary Refill: Capillary refill takes less than 2 seconds.      Coloration: Skin is not pale.      Findings: No erythema.   Neurological:      General: No focal deficit present.      Mental Status: She is alert and oriented to person, place, and time. Mental status is at baseline.      Motor: No weakness.      Gait: Gait normal.   Psychiatric:         Mood and Affect: Mood normal.         Behavior: Behavior normal.         Results Reviewed       Procedure Component Value Units Date/Time    Comprehensive metabolic panel [[id number]] Collected: 11/09/24 1927    Lab Status: Final result Specimen: Blood from Arm, Left Updated: 11/09/24 1956     Sodium 138 mmol/L      Potassium 3.8 mmol/L      Chloride 105 mmol/L      CO2 28 mmol/L      ANION GAP 5 mmol/L      BUN 13 mg/dL      Creatinine 0.74 mg/dL      Glucose 97 mg/dL      Calcium 9.1 mg/dL      AST 20 U/L      ALT 27 U/L      Alkaline Phosphatase 50 U/L      Total Protein 6.8 g/dL      Albumin 4.4 g/dL      Total  Bilirubin 0.62 mg/dL      eGFR 108 ml/min/1.73sq m     Narrative:      National Kid[employer] guidelines for Chronic Kidney Disease (CKD):     Stage 1 with normal or high GFR (GFR > 90 mL/min/1.73 square meters)    Stage 2 Mild CKD (GFR = 60-89 mL/min/1.73 square meters)    Stage 3A Moderate CKD (GFR = 45-59 mL/min/1.73 square meters)    Stage 3B Moderate CKD (GFR = 30-44 mL/min/1.73 square meters)    Stage 4 Severe CKD (GFR = 15-29 mL/min/1.73 square meters)    Stage 5 End Stage CKD (GFR <15 mL/min/1.73 square meters)  Note: GFR calculation is accurate only with a steady state creatinine    Lipase [[id number]]  (Normal) Collected: 11/09/24 1927    Lab Status: Final result Specimen: Blood from Arm, Left Updated: 11/09/24 1956     Lipase 33 u/L     Urine Microscopic [[id number]]  (Abnormal) Collected: 11/09/24 1929    Lab Status: Final result Specimen: Urine, Clean Catch Updated: 11/09/24 1948     RBC, UA 1-2 /hpf      WBC, UA 10-20 /hpf      Epithelial Cells Moderate /hpf      Bacteria, UA Occasional /hpf      MUCUS THREADS Moderate    Urine culture [[id number]] Collected: 11/09/24 1929    Lab Status: In process Specimen: Urine, Clean Catch Updated: 11/09/24 1948    UA w Reflex to Microscopic w Reflex to Culture [[id number]]  (Abnormal) Collected: 11/09/24 1929    Lab Status: Final result Specimen: Urine, Clean Catch Updated: 11/09/24 1943     Color, UA Yellow     Clarity, UA Turbid     Specific Gravity, UA 1.025     pH, UA 5.5     Leukocytes, UA Small     Nitrite, UA Negative     Protein, UA Trace mg/dl      Glucose, UA Negative mg/dl      Ketones, UA Negative mg/dl      Urobilinogen, UA <2.0 mg/dl      Bilirubin, UA Negative     Occult Blood, UA Trace    CBC and differential [[id number]]  (Abnormal) Collected: 11/09/24 1927    Lab Status: Final result Specimen: Blood from Arm, Left Updated: 11/09/24 1939     WBC 9.72 Thousand/uL      RBC 4.82 Million/uL      Hemoglobin 15.6 g/dL      Hematocrit 45.4 %       MCV 94 fL      MCH 32.4 pg      MCHC 34.4 g/dL      RDW 12.4 %      MPV 9.2 fL      Platelets 239 Thousands/uL      nRBC 0 /100 WBCs      Segmented % 48 %      Immature Grans % 0 %      Lymphocytes % 34 %      Monocytes % 6 %      Eosinophils Relative 11 %      Basophils Relative 1 %      Absolute Neutrophils 4.65 Thousands/µL      Absolute Immature Grans 0.03 Thousand/uL      Absolute Lymphocytes 3.29 Thousands/µL      Absolute Monocytes 0.62 Thousand/µL      Eosinophils Absolute 1.04 Thousand/µL      Basophils Absolute 0.09 Thousands/µL     POCT pregnancy, urine [[id number]]  (Normal) Resulted: 11/09/24 1938    Lab Status: Final result Updated: 11/09/24 1938     EXT Preg Test, Ur Negative     Control Valid            CT abdomen pelvis with contrast   ED Interpretation by Isabel Pinzon PA-C (11/09 2205)   COMPARISON: CT ABDOMEN PELVIS W CONTRAST 7/20/2021 4:53 PM FINDINGS: Lungs: There is respiratory motion artifact limiting the lung window image quality. The lung bases are unremarkable with minimal dependent changes. Liver: The liver demonstrates normal contour and configuration. Gallbladder and biliary ducts: The gallbladder demonstrates contracted contour and configuration without stones or evidence of inflammation. The intrahepatic and extrahepatic bile ducts are normal in contour. Pancreas: The pancreas demonstrates normal contour and configuration. The pancreatic duct is normal. Spleen: The spleen demonstrates normal contour and configuration. Adrenal glands: The adrenal glands demonstrate normal contour and configuration bilaterally. Kidneys and ureters: The kidneys are nearly symmetric in size with normal contour and configuration. The collecting systems are normal. There are no significant renal calculi. Stomach and bowel: The colon and small bowel demonstrate normal contour a   nd configuration. The stomach is decompressed. Appendix: There is no acute right lower quadrant inflammation or evidence of  acute appendicitis. Intraperitoneal space: There is fluid in the cul-de-sac. Retroperitoneal space: There is no retroperitoneal fluid. Vasculature: The abdominal aorta and inferior vena cava demonstrate normal contour and configuration. The mesenteric artery and veins demonstrates normal enhancement, contour, configuration and orientation. Lymph nodes: There are no abnormally enlarged retroperitoneal lymph nodes or masses. There are no abnormally enlarged mesenteric lymph nodes or masses. There are no significantly enlarged inguinal region lymph nodes or masses. Urinary bladder: The urinary bladder is decompressed. JJ NOVA Accession: [id number] MRN: [id number]  Preliminary Radiology Report  (QA) DISCREPANCY? If there is a discrepancy between the preliminary and final interpretation, please notify [employer] via [email or web address]. If y   ou do not have access to our QA portal, call our QA team at [phone] CONFIDENTIALITY STATEMENT This report is intended only for the use of the referring physician, and only in accordance with law, If you received this in error, call [phone] Page 2 of 2 Reproductive: Anteverted uterus is in place with enhancing mucosa in the lower uterine segment. Bones/joints: There is left lateral curvature deformity, levoconvex deformity of the lower lumbar spine. Soft tissues: There are no abnormally enlarged pelvic sidewall soft tissue or pelvic fluid. The anterior abdominal wall demonstrates normal contour and configuration without defect. IMPRESSION: 1. No abdominal or pelvic masses. 2. No evidence of acute inflammation. 3. Cul-de-sac fluid        XR chest 1 view portable   ED Interpretation by Isabel Pinzon PA-C (11/09 1957)   No acute cardiopulmonary abnormalities per my read          ECG 12 Lead Documentation Only    Date/Time: 11/9/2024 7:36 PM    Performed by: Isabel Pinzon PA-C  Authorized by: Isabel Pinzon PA-C    Indications / Diagnosis:   LUQ pain  ECG reviewed by me, the ED Provider: yes    Patient location:  ED  Previous ECG:     Previous ECG:  Unavailable    Comparison to cardiac monitor: Yes    Interpretation:     Interpretation: normal    Rate:     ECG rate:  70    ECG rate assessment: normal    Rhythm:     Rhythm: sinus rhythm    Ectopy:     Ectopy: none    QRS:     QRS axis:  Normal    QRS intervals:  Normal  Conduction:     Conduction: normal    ST segments:     ST segments:  Normal  T waves:     T waves: normal        ED Medication and Procedure Management   Prior to Admission Medications   Prescriptions Last Dose Informant Patient Reported? Taking?   acetaminophen (TYLENOL) 500 mg tablet   No No   Sig: Take 2 tablets (1,000 mg total) by mouth every 8 (eight) hours as needed for mild pain   fluticasone (FLONASE) 50 mcg/act nasal spray   No No   Si spray into each nostril daily   ibuprofen (MOTRIN) 600 mg tablet   No No   Sig: Take 1 tablet (600 mg total) by mouth every 6 (six) hours as needed for moderate pain   methocarbamol (ROBAXIN) 500 mg tablet   No No   Sig: Take 1 tablet (500 mg total) by mouth every 8 (eight) hours as needed for muscle spasms for up to 7 days   naproxen (Naprosyn) 500 mg tablet   No No   Sig: Take 1 tablet (500 mg total) by mouth every 12 (twelve) hours as needed (pain) for up to 10 days Take with food.   phenazopyridine (PYRIDIUM) 200 mg tablet   No No   Sig: Take 1 tablet (200 mg total) by mouth 3 (three) times a day   Patient not taking: Reported on 2/3/2020      Facility-Administered Medications: None     Discharge Medication List as of 2024 10:15 PM        START taking these medications    Details   cephalexin (KEFLEX) 500 mg capsule Take 1 capsule (500 mg total) by mouth 4 (four) times a day for 5 days, Starting Sat 2024, Until Thu 2024, Normal           CONTINUE these medications which have NOT CHANGED    Details   acetaminophen (TYLENOL) 500 mg tablet Take 2 tablets (1,000 mg total) by  mouth every 8 (eight) hours as needed for mild pain, Starting Sat 11/4/2023, Normal      fluticasone (FLONASE) 50 mcg/act nasal spray 1 spray into each nostril daily, Starting Sat 11/4/2023, Normal      ibuprofen (MOTRIN) 600 mg tablet Take 1 tablet (600 mg total) by mouth every 6 (six) hours as needed for moderate pain, Starting Sat 11/4/2023, Normal      methocarbamol (ROBAXIN) 500 mg tablet Take 1 tablet (500 mg total) by mouth every 8 (eight) hours as needed for muscle spasms for up to 7 days, Starting Fri 10/6/2023, Until Fri 10/13/2023 at 2359, Normal      naproxen (Naprosyn) 500 mg tablet Take 1 tablet (500 mg total) by mouth every 12 (twelve) hours as needed (pain) for up to 10 days Take with food., Starting Fri 10/6/2023, Until Mon 10/16/2023 at 2359, Normal      phenazopyridine (PYRIDIUM) 200 mg tablet Take 1 tablet (200 mg total) by mouth 3 (three) times a day, Starting Sat 9/15/2018, Normal           No discharge procedures on file.  ED SEPSIS DOCUMENTATION   Time reflects when diagnosis was documented in both MDM as applicable and the Disposition within this note       Time User Action Codes Description Comment    11/9/2024 10:13 PM Isabel Pinzon [R10.12] Left upper quadrant abdominal pain     11/9/2024 10:14 PM Isabel Pinzon [N39.0] UTI (urinary tract infection)                  Isabel Pinzon PA-C  11/10/24 0408

## 2024-11-10 NOTE — DISCHARGE INSTRUCTIONS
Please take your antibiotic 4 times a day for the next 5 days. Follow up with PCP. Return to the ER if symptoms worsen.

## 2024-11-12 LAB
ATRIAL RATE: 70 BPM
P AXIS: 56 DEGREES
PR INTERVAL: 116 MS
QRS AXIS: 80 DEGREES
QRSD INTERVAL: 74 MS
QT INTERVAL: 394 MS
QTC INTERVAL: 425 MS
T WAVE AXIS: 22 DEGREES
VENTRICULAR RATE: 70 BPM

## 2024-11-12 PROCEDURE — 93010 ELECTROCARDIOGRAM REPORT: CPT | Performed by: INTERNAL MEDICINE

## 2024-12-27 ENCOUNTER — HOSPITAL ENCOUNTER (EMERGENCY)
Facility: HOSPITAL | Age: 29
Discharge: HOME | End: 2024-12-27
Attending: EMERGENCY MEDICINE
Payer: COMMERCIAL

## 2024-12-27 VITALS
DIASTOLIC BLOOD PRESSURE: 59 MMHG | HEART RATE: 75 BPM | SYSTOLIC BLOOD PRESSURE: 105 MMHG | RESPIRATION RATE: 16 BRPM | OXYGEN SATURATION: 97 %

## 2024-12-27 DIAGNOSIS — H61.22 IMPACTED CERUMEN OF LEFT EAR: Primary | ICD-10-CM

## 2024-12-27 DIAGNOSIS — H60.92 LEFT OTITIS EXTERNA: ICD-10-CM

## 2024-12-27 PROCEDURE — 69209 REMOVE IMPACTED EAR WAX UNI: CPT | Performed by: PHYSICIAN ASSISTANT

## 2024-12-27 PROCEDURE — 99284 EMERGENCY DEPT VISIT MOD MDM: CPT | Performed by: PHYSICIAN ASSISTANT

## 2024-12-27 PROCEDURE — 99282 EMERGENCY DEPT VISIT SF MDM: CPT

## 2024-12-27 RX ORDER — IBUPROFEN 600 MG/1
600 TABLET, FILM COATED ORAL ONCE
Status: COMPLETED | OUTPATIENT
Start: 2024-12-27 | End: 2024-12-27

## 2024-12-27 RX ORDER — OFLOXACIN 3 MG/ML
10 SOLUTION AURICULAR (OTIC) DAILY
Qty: 5 ML | Refills: 0 | Status: SHIPPED | OUTPATIENT
Start: 2024-12-27 | End: 2025-01-03

## 2024-12-27 RX ADMIN — IBUPROFEN 600 MG: 600 TABLET, FILM COATED ORAL at 18:15

## 2024-12-27 NOTE — ED PROVIDER NOTES
Time reflects when diagnosis was documented in both MDM as applicable and the Disposition within this note       Time User Action Codes Description Comment    2024  6:04 PM Lyric Hayes Add [H61.22] Impacted cerumen of left ear     2024  6:04 PM Lyric Hayes Add [H60.92] Left otitis externa           ED Disposition       ED Disposition   Discharge    Condition   Stable    Date/Time   Fri Dec 27, 2024  6:04 PM    Comment   Gilma Conner discharge to home/self care.                   Assessment & Plan       Medical Decision Making  Patient with left ear pain, some cerumen noted.  After discussion with patient she would like me to try to irrigate here in emergency department.  Irrigated left ear gently with mixture of warm water/H2O2, did get some cerumen out, irrigation terminated as patient was complaining of worsening pain and there was only small amount of soft wax remaining.  Patient improved and stable for discharge outpatient follow-up will give drops, treat with ibuprofen here in emergency department    Risk  Prescription drug management.             Medications   ibuprofen (MOTRIN) tablet 600 mg (600 mg Oral Given 24)       ED Risk Strat Scores                                              History of Present Illness       Chief Complaint   Patient presents with    Earache     Pt reports L earache x 2 days, using tylenol without relief       Past Medical History:   Diagnosis Date    Kidney stones       Past Surgical History:   Procedure Laterality Date     SECTION      x 1    TONSILLECTOMY        Family History   Problem Relation Age of Onset    Diabetes Mother     Hypothyroidism Mother       Social History     Tobacco Use    Smoking status: Some Days     Current packs/day: 1.00     Types: Cigarettes    Smokeless tobacco: Never   Vaping Use    Vaping status: Never Used   Substance Use Topics    Alcohol use: No     Comment: Occ    Drug use: No      E-Cigarette/Vaping     E-Cigarette Use Never User       E-Cigarette/Vaping Substances    Nicotine No     THC No     CBD No     Flavoring No     Other No     Unknown No       I have reviewed and agree with the history as documented.     PMH reviewed  PSH reviewed  Patient presents to emergency department complaining of 2-day history of ear pain that started as clogged feeling she thought there was wax inside of it did try to irrigated at home with water and bulb syringe states pain is gotten worse.  Patient denies fever, drainage, sore throat, neck pain, headaches.          Review of Systems   Constitutional:  Negative for fever.   HENT:  Positive for ear pain. Negative for congestion, ear discharge, facial swelling and sore throat.    Respiratory:  Negative for shortness of breath.    Gastrointestinal:  Negative for vomiting.   Skin:  Negative for rash.   All other systems reviewed and are negative.          Objective       ED Triage Vitals [12/27/24 1734]   Temp Pulse Blood Pressure Respirations SpO2 Patient Position - Orthostatic VS   -- 75 105/59 16 97 % --      Temp src Heart Rate Source BP Location FiO2 (%) Pain Score    -- -- -- -- --      Vitals      Date and Time Temp Pulse SpO2 Resp BP Pain Score FACES Pain Rating User   12/27/24 1734 -- 75 97 % 16 105/59 -- -- BG            Physical Exam  Vitals and nursing note reviewed.   Constitutional:       General: She is not in acute distress.     Appearance: She is well-developed.   HENT:      Head: Normocephalic and atraumatic.      Right Ear: Hearing, tympanic membrane, ear canal and external ear normal.      Left Ear: External ear normal. No drainage. There is impacted cerumen.      Ears:      Comments: Some dark cerumen noted to distal canal.no pain with palpation, no pain to mastoid area.  After  Irrigation still unable to full evaluate TM but only scant yellowish cerumen remains     Nose: Nose normal.      Mouth/Throat:      Mouth: Mucous membranes are moist.      Pharynx:  Oropharynx is clear.   Eyes:      Conjunctiva/sclera: Conjunctivae normal.   Cardiovascular:      Rate and Rhythm: Normal rate.   Pulmonary:      Effort: Pulmonary effort is normal.   Musculoskeletal:         General: Normal range of motion.      Cervical back: Normal range of motion.   Skin:     General: Skin is warm and dry.      Findings: No rash.   Neurological:      Mental Status: She is alert.   Psychiatric:         Behavior: Behavior normal.         Results Reviewed       None            No orders to display       Procedures    ED Medication and Procedure Management   Prior to Admission Medications   Prescriptions Last Dose Informant Patient Reported? Taking?   acetaminophen (TYLENOL) 500 mg tablet   No No   Sig: Take 2 tablets (1,000 mg total) by mouth every 8 (eight) hours as needed for mild pain   fluticasone (FLONASE) 50 mcg/act nasal spray   No No   Si spray into each nostril daily   ibuprofen (MOTRIN) 600 mg tablet   No No   Sig: Take 1 tablet (600 mg total) by mouth every 6 (six) hours as needed for moderate pain   methocarbamol (ROBAXIN) 500 mg tablet   No No   Sig: Take 1 tablet (500 mg total) by mouth every 8 (eight) hours as needed for muscle spasms for up to 7 days   naproxen (Naprosyn) 500 mg tablet   No No   Sig: Take 1 tablet (500 mg total) by mouth every 12 (twelve) hours as needed (pain) for up to 10 days Take with food.   phenazopyridine (PYRIDIUM) 200 mg tablet   No No   Sig: Take 1 tablet (200 mg total) by mouth 3 (three) times a day   Patient not taking: Reported on 2/3/2020      Facility-Administered Medications: None     Discharge Medication List as of 2024  6:06 PM        START taking these medications    Details   ofloxacin (FLOXIN) 0.3 % otic solution Administer 10 drops to the right ear daily for 7 days, Starting 2024, Until Fri 1/3/2025, Normal           CONTINUE these medications which have NOT CHANGED    Details   acetaminophen (TYLENOL) 500 mg tablet Take 2  tablets (1,000 mg total) by mouth every 8 (eight) hours as needed for mild pain, Starting Sat 11/4/2023, Normal      fluticasone (FLONASE) 50 mcg/act nasal spray 1 spray into each nostril daily, Starting Sat 11/4/2023, Normal      ibuprofen (MOTRIN) 600 mg tablet Take 1 tablet (600 mg total) by mouth every 6 (six) hours as needed for moderate pain, Starting Sat 11/4/2023, Normal      methocarbamol (ROBAXIN) 500 mg tablet Take 1 tablet (500 mg total) by mouth every 8 (eight) hours as needed for muscle spasms for up to 7 days, Starting Fri 10/6/2023, Until Fri 10/13/2023 at 2359, Normal      naproxen (Naprosyn) 500 mg tablet Take 1 tablet (500 mg total) by mouth every 12 (twelve) hours as needed (pain) for up to 10 days Take with food., Starting Fri 10/6/2023, Until Mon 10/16/2023 at 2359, Normal      phenazopyridine (PYRIDIUM) 200 mg tablet Take 1 tablet (200 mg total) by mouth 3 (three) times a day, Starting Sat 9/15/2018, Normal           No discharge procedures on file.  ED SEPSIS DOCUMENTATION   Time reflects when diagnosis was documented in both MDM as applicable and the Disposition within this note       Time User Action Codes Description Comment    12/27/2024  6:04 PM Lyric Hayes [H61.22] Impacted cerumen of left ear     12/27/2024  6:04 PM Lyric Hayes [H60.92] Left otitis externa                  Lyric Hayes PA-C  12/27/24 0142

## 2024-12-27 NOTE — DISCHARGE INSTRUCTIONS
You can irrigate canal gently with warm water and hydrogen peroxide.  Then use drops daily.  Tylenol or motrin for pain